# Patient Record
Sex: MALE | Race: OTHER | HISPANIC OR LATINO
[De-identification: names, ages, dates, MRNs, and addresses within clinical notes are randomized per-mention and may not be internally consistent; named-entity substitution may affect disease eponyms.]

---

## 2018-10-17 ENCOUNTER — APPOINTMENT (OUTPATIENT)
Dept: ORTHOPEDIC SURGERY | Facility: CLINIC | Age: 68
End: 2018-10-17
Payer: COMMERCIAL

## 2018-10-17 PROCEDURE — 99203 OFFICE O/P NEW LOW 30 MIN: CPT

## 2018-10-25 ENCOUNTER — OUTPATIENT (OUTPATIENT)
Dept: OUTPATIENT SERVICES | Facility: HOSPITAL | Age: 68
LOS: 1 days | Discharge: HOME | End: 2018-10-25
Payer: MEDICARE

## 2018-10-25 VITALS
HEART RATE: 82 BPM | SYSTOLIC BLOOD PRESSURE: 132 MMHG | TEMPERATURE: 98 F | HEIGHT: 64 IN | DIASTOLIC BLOOD PRESSURE: 85 MMHG | WEIGHT: 178.13 LBS | RESPIRATION RATE: 18 BRPM | OXYGEN SATURATION: 96 %

## 2018-10-25 DIAGNOSIS — Z96.7 PRESENCE OF OTHER BONE AND TENDON IMPLANTS: Chronic | ICD-10-CM

## 2018-10-25 DIAGNOSIS — C61 MALIGNANT NEOPLASM OF PROSTATE: ICD-10-CM

## 2018-10-25 DIAGNOSIS — Z94.7 CORNEAL TRANSPLANT STATUS: Chronic | ICD-10-CM

## 2018-10-25 DIAGNOSIS — Z01.818 ENCOUNTER FOR OTHER PREPROCEDURAL EXAMINATION: ICD-10-CM

## 2018-10-25 DIAGNOSIS — S83.241A OTHER TEAR OF MEDIAL MENISCUS, CURRENT INJURY, RIGHT KNEE, INITIAL ENCOUNTER: ICD-10-CM

## 2018-10-25 DIAGNOSIS — E78.5 HYPERLIPIDEMIA, UNSPECIFIED: ICD-10-CM

## 2018-10-25 LAB
ANION GAP SERPL CALC-SCNC: 8 MMOL/L — SIGNIFICANT CHANGE UP (ref 5–17)
BASOPHILS # BLD AUTO: 0.05 K/UL — SIGNIFICANT CHANGE UP (ref 0–0.2)
BASOPHILS NFR BLD AUTO: 1.1 % — SIGNIFICANT CHANGE UP (ref 0–2)
BUN SERPL-MCNC: 21 MG/DL — SIGNIFICANT CHANGE UP (ref 7–23)
CALCIUM SERPL-MCNC: 8.8 MG/DL — SIGNIFICANT CHANGE UP (ref 8.5–10.1)
CHLORIDE SERPL-SCNC: 105 MMOL/L — SIGNIFICANT CHANGE UP (ref 96–108)
CO2 SERPL-SCNC: 30 MMOL/L — SIGNIFICANT CHANGE UP (ref 22–31)
CREAT SERPL-MCNC: 0.91 MG/DL — SIGNIFICANT CHANGE UP (ref 0.5–1.3)
EOSINOPHIL # BLD AUTO: 0.27 K/UL — SIGNIFICANT CHANGE UP (ref 0–0.5)
EOSINOPHIL NFR BLD AUTO: 6.2 % — HIGH (ref 0–6)
GLUCOSE SERPL-MCNC: 84 MG/DL — SIGNIFICANT CHANGE UP (ref 70–99)
HCT VFR BLD CALC: 43.7 % — SIGNIFICANT CHANGE UP (ref 39–50)
HGB BLD-MCNC: 14.6 G/DL — SIGNIFICANT CHANGE UP (ref 13–17)
IMM GRANULOCYTES NFR BLD AUTO: 0.2 % — SIGNIFICANT CHANGE UP (ref 0–1.5)
LYMPHOCYTES # BLD AUTO: 1.61 K/UL — SIGNIFICANT CHANGE UP (ref 1–3.3)
LYMPHOCYTES # BLD AUTO: 36.8 % — SIGNIFICANT CHANGE UP (ref 13–44)
MCHC RBC-ENTMCNC: 30.5 PG — SIGNIFICANT CHANGE UP (ref 27–34)
MCHC RBC-ENTMCNC: 33.4 GM/DL — SIGNIFICANT CHANGE UP (ref 32–36)
MCV RBC AUTO: 91.2 FL — SIGNIFICANT CHANGE UP (ref 80–100)
MONOCYTES # BLD AUTO: 0.63 K/UL — SIGNIFICANT CHANGE UP (ref 0–0.9)
MONOCYTES NFR BLD AUTO: 14.4 % — HIGH (ref 2–14)
NEUTROPHILS # BLD AUTO: 1.8 K/UL — SIGNIFICANT CHANGE UP (ref 1.8–7.4)
NEUTROPHILS NFR BLD AUTO: 41.3 % — LOW (ref 43–77)
NRBC # BLD: 0 /100 WBCS — SIGNIFICANT CHANGE UP (ref 0–0)
PLATELET # BLD AUTO: 187 K/UL — SIGNIFICANT CHANGE UP (ref 150–400)
POTASSIUM SERPL-MCNC: 4.2 MMOL/L — SIGNIFICANT CHANGE UP (ref 3.5–5.3)
POTASSIUM SERPL-SCNC: 4.2 MMOL/L — SIGNIFICANT CHANGE UP (ref 3.5–5.3)
RBC # BLD: 4.79 M/UL — SIGNIFICANT CHANGE UP (ref 4.2–5.8)
RBC # FLD: 14.2 % — SIGNIFICANT CHANGE UP (ref 10.3–14.5)
SODIUM SERPL-SCNC: 143 MMOL/L — SIGNIFICANT CHANGE UP (ref 135–145)
WBC # BLD: 4.37 K/UL — SIGNIFICANT CHANGE UP (ref 3.8–10.5)
WBC # FLD AUTO: 4.37 K/UL — SIGNIFICANT CHANGE UP (ref 3.8–10.5)

## 2018-10-25 PROCEDURE — 93010 ELECTROCARDIOGRAM REPORT: CPT | Mod: NC

## 2018-10-25 NOTE — H&P PST ADULT - HISTORY OF PRESENT ILLNESS
68M pmh prostate CA s/p RT 2016, hl, c/o Right knee pain here for PST for scheduled Right knee arthroscopy

## 2018-10-25 NOTE — H&P PST ADULT - PSH
History of cornea transplant  2002  Status post open reduction with internal fixation of fracture  left arm 2013

## 2018-10-25 NOTE — H&P PST ADULT - ASSESSMENT
68M pmh prostate CA s/p RT 2016, hl, c/o Right knee pain here for PST for scheduled Right knee arthroscopy  CAPRINI SCORE    AGE RELATED RISK FACTORS                                                       MOBILITY RELATED FACTORS  [ ] Age 41-60 years                                            (1 Point)                  [ ] Bed rest                                                        (1 Point)  [x ] Age: 61-74 years                                           (2 Points)                [ ] Plaster cast                                                   (2 Points)  [ ] Age= 75 years                                              (3 Points)                 [ ] Bed bound for more than 72 hours                   (2 Points)    DISEASE RELATED RISK FACTORS                                               GENDER SPECIFIC FACTORS  [x ] Edema in the lower extremities                       (1 Point)                  [ ] Pregnancy                                                     (1 Point)  [x ] Varicose veins                                               (1 Point)                  [ ] Post-partum < 6 weeks                                   (1 Point)             [ x] BMI > 25 Kg/m2                                            (1 Point)                  [ ] Hormonal therapy  or oral contraception            (1 Point)                 [ ] Sepsis (in the previous month)                        (1 Point)                  [ ] History of pregnancy complications  [ ] Pneumonia or serious lung disease                                               [ ] Unexplained or recurrent                       (1 Point)           (in the previous month)                               (1 Point)  [ ] Abnormal pulmonary function test                     (1 Point)                 SURGERY RELATED RISK FACTORS  [ ] Acute myocardial infarction                              (1 Point)                 [ ]  Section                                            (1 Point)  [ ] Congestive heart failure (in the previous month)  (1 Point)                 [ ] Minor surgery                                                 (1 Point)   [ ] Inflammatory bowel disease                             (1 Point)                 [x ] Arthroscopic surgery                                        (2 Points)  [ ] Central venous access                                    (2 Points)                [ ] General surgery lasting more than 45 minutes   (2 Points)       [ ] Stroke (in the previous month)                          (5 Points)               [ ] Elective arthroplasty                                        (5 Points)                                                                                                                                               HEMATOLOGY RELATED FACTORS                                                 TRAUMA RELATED RISK FACTORS  [ ] Prior episodes of VTE                                     (3 Points)                 [ ] Fracture of the hip, pelvis, or leg                       (5 Points)  [ ] Positive family history for VTE                         (3 Points)                 [ ] Acute spinal cord injury (in the previous month)  (5 Points)  [ ] Prothrombin 88230 A                                      (3 Points)                 [ ] Paralysis  (less than 1 month)                          (5 Points)  [ ] Factor V Leiden                                             (3 Points)                 [ ] Multiple Trauma within 1 month                         (5 Points)  [ ] Lupus anticoagulants                                     (3 Points)                                                           [ ] Anticardiolipin antibodies                                (3 Points)                                                       [ ] High homocysteine in the blood                      (3 Points)                                             [ ] Other congenital or acquired thrombophilia       (3 Points)                                                [ ] Heparin induced thrombocytopenia                  (3 Points)                                          Total Score [       7   ]

## 2018-10-26 RX ORDER — SODIUM CHLORIDE 9 MG/ML
3 INJECTION INTRAMUSCULAR; INTRAVENOUS; SUBCUTANEOUS EVERY 8 HOURS
Qty: 0 | Refills: 0 | Status: DISCONTINUED | OUTPATIENT
Start: 2018-11-02 | End: 2018-11-17

## 2018-11-01 ENCOUNTER — TRANSCRIPTION ENCOUNTER (OUTPATIENT)
Age: 68
End: 2018-11-01

## 2018-11-02 ENCOUNTER — OUTPATIENT (OUTPATIENT)
Dept: OUTPATIENT SERVICES | Facility: HOSPITAL | Age: 68
LOS: 1 days | Discharge: HOME | End: 2018-11-02
Payer: MEDICARE

## 2018-11-02 ENCOUNTER — APPOINTMENT (OUTPATIENT)
Dept: ORTHOPEDIC SURGERY | Facility: HOSPITAL | Age: 68
End: 2018-11-02

## 2018-11-02 ENCOUNTER — RESULT REVIEW (OUTPATIENT)
Age: 68
End: 2018-11-02

## 2018-11-02 VITALS
WEIGHT: 182.98 LBS | RESPIRATION RATE: 16 BRPM | HEART RATE: 75 BPM | OXYGEN SATURATION: 98 % | TEMPERATURE: 97 F | DIASTOLIC BLOOD PRESSURE: 86 MMHG | HEIGHT: 64 IN | SYSTOLIC BLOOD PRESSURE: 149 MMHG

## 2018-11-02 VITALS
OXYGEN SATURATION: 99 % | DIASTOLIC BLOOD PRESSURE: 76 MMHG | RESPIRATION RATE: 16 BRPM | HEART RATE: 63 BPM | SYSTOLIC BLOOD PRESSURE: 160 MMHG

## 2018-11-02 DIAGNOSIS — Z94.7 CORNEAL TRANSPLANT STATUS: Chronic | ICD-10-CM

## 2018-11-02 DIAGNOSIS — Z96.7 PRESENCE OF OTHER BONE AND TENDON IMPLANTS: Chronic | ICD-10-CM

## 2018-11-02 PROCEDURE — 88304 TISSUE EXAM BY PATHOLOGIST: CPT | Mod: 26

## 2018-11-02 PROCEDURE — 29881 ARTHRS KNE SRG MNISECTMY M/L: CPT | Mod: RT

## 2018-11-02 RX ORDER — DOCUSATE SODIUM 100 MG
1 CAPSULE ORAL
Qty: 21 | Refills: 0
Start: 2018-11-02 | End: 2018-11-08

## 2018-11-02 RX ORDER — SODIUM CHLORIDE 9 MG/ML
1000 INJECTION, SOLUTION INTRAVENOUS
Qty: 0 | Refills: 0 | Status: DISCONTINUED | OUTPATIENT
Start: 2018-11-02 | End: 2018-11-02

## 2018-11-02 RX ORDER — FENTANYL CITRATE 50 UG/ML
50 INJECTION INTRAVENOUS
Qty: 0 | Refills: 0 | Status: DISCONTINUED | OUTPATIENT
Start: 2018-11-02 | End: 2018-11-02

## 2018-11-02 RX ORDER — ASPIRIN/CALCIUM CARB/MAGNESIUM 324 MG
1 TABLET ORAL
Qty: 30 | Refills: 0
Start: 2018-11-02 | End: 2018-12-01

## 2018-11-02 RX ORDER — ACETAMINOPHEN 500 MG
1000 TABLET ORAL ONCE
Qty: 0 | Refills: 0 | Status: COMPLETED | OUTPATIENT
Start: 2018-11-02 | End: 2018-11-02

## 2018-11-02 RX ORDER — FENTANYL CITRATE 50 UG/ML
25 INJECTION INTRAVENOUS
Qty: 0 | Refills: 0 | Status: DISCONTINUED | OUTPATIENT
Start: 2018-11-02 | End: 2018-11-02

## 2018-11-02 RX ORDER — ASPIRIN/CALCIUM CARB/MAGNESIUM 324 MG
325 TABLET ORAL
Qty: 0 | Refills: 0 | COMMUNITY

## 2018-11-02 RX ORDER — ONDANSETRON 8 MG/1
1 TABLET, FILM COATED ORAL
Qty: 28 | Refills: 0 | OUTPATIENT
Start: 2018-11-02 | End: 2018-11-08

## 2018-11-02 RX ADMIN — SODIUM CHLORIDE 75 MILLILITER(S): 9 INJECTION, SOLUTION INTRAVENOUS at 13:22

## 2018-11-02 RX ADMIN — Medication 1000 MILLIGRAM(S): at 13:40

## 2018-11-02 RX ADMIN — FENTANYL CITRATE 25 MICROGRAM(S): 50 INJECTION INTRAVENOUS at 13:40

## 2018-11-02 RX ADMIN — Medication 400 MILLIGRAM(S): at 13:22

## 2018-11-02 RX ADMIN — FENTANYL CITRATE 25 MICROGRAM(S): 50 INJECTION INTRAVENOUS at 13:18

## 2018-11-02 NOTE — ASU DISCHARGE PLAN (ADULT/PEDIATRIC). - MEDICATION SUMMARY - MEDICATIONS TO TAKE
I will START or STAY ON the medications listed below when I get home from the hospital:    Aspirin Enteric Coated 325 mg oral delayed release tablet  -- 1 tab(s) by mouth once a day . For Blood Clot Prevention MDD:1  -- Swallow whole.  Do not crush.  Take with food or milk.    -- Indication: For dvt ppx    Norco 5 mg-325 mg oral tablet  -- 1 tab(s) by mouth every 4 to 6 hours, As Needed -for severe pain MDD:4   -- Caution federal law prohibits the transfer of this drug to any person other  than the person for whom it was prescribed.  May cause drowsiness.  Alcohol may intensify this effect.  Use care when operating dangerous machinery.  This product contains acetaminophen.  Do not use  with any other product containing acetaminophen to prevent possible liver damage.  Using more of this medication than prescribed may cause serious breathing problems.    -- Indication: For severe pain    Flomax  -- 1 cap(s) by mouth once a day  -- Indication: For per pmd    ondansetron 4 mg oral tablet  -- 1 tab(s) by mouth every 6 hours   -- Indication: For antinausea    fenofibrate 145 mg oral tablet  -- 1 tab(s) by mouth once a day  -- Indication: For per pmd    docusate sodium 100 mg oral tablet  -- 1 tab(s) by mouth 3 times a day, As Needed -for constipation MDD:3  -- Medication should be taken with plenty of water.    -- Indication: For laxative

## 2018-11-02 NOTE — BRIEF OPERATIVE NOTE - PROCEDURE
<<-----Click on this checkbox to enter Procedure Partial meniscectomy of knee  11/02/2018    Active  KATHLEEN

## 2018-11-05 LAB — SURGICAL PATHOLOGY STUDY: SIGNIFICANT CHANGE UP

## 2018-11-06 DIAGNOSIS — Z79.82 LONG TERM (CURRENT) USE OF ASPIRIN: ICD-10-CM

## 2018-11-06 DIAGNOSIS — M25.561 PAIN IN RIGHT KNEE: ICD-10-CM

## 2018-11-06 DIAGNOSIS — Z85.46 PERSONAL HISTORY OF MALIGNANT NEOPLASM OF PROSTATE: ICD-10-CM

## 2018-11-06 DIAGNOSIS — M17.11 UNILATERAL PRIMARY OSTEOARTHRITIS, RIGHT KNEE: ICD-10-CM

## 2018-11-06 DIAGNOSIS — E66.9 OBESITY, UNSPECIFIED: ICD-10-CM

## 2018-11-06 DIAGNOSIS — E78.5 HYPERLIPIDEMIA, UNSPECIFIED: ICD-10-CM

## 2018-11-06 DIAGNOSIS — M23.231 DERANGEMENT OF OTHER MEDIAL MENISCUS DUE TO OLD TEAR OR INJURY, RIGHT KNEE: ICD-10-CM

## 2018-11-16 ENCOUNTER — RECORD ABSTRACTING (OUTPATIENT)
Age: 68
End: 2018-11-16

## 2018-11-16 ENCOUNTER — APPOINTMENT (OUTPATIENT)
Dept: ORTHOPEDIC SURGERY | Facility: CLINIC | Age: 68
End: 2018-11-16
Payer: MEDICARE

## 2018-11-16 DIAGNOSIS — Z80.9 FAMILY HISTORY OF MALIGNANT NEOPLASM, UNSPECIFIED: ICD-10-CM

## 2018-11-16 DIAGNOSIS — Z85.46 PERSONAL HISTORY OF MALIGNANT NEOPLASM OF PROSTATE: ICD-10-CM

## 2018-11-16 PROBLEM — C61 MALIGNANT NEOPLASM OF PROSTATE: Chronic | Status: ACTIVE | Noted: 2018-10-25

## 2018-11-16 PROBLEM — E78.5 HYPERLIPIDEMIA, UNSPECIFIED: Chronic | Status: ACTIVE | Noted: 2018-10-25

## 2018-11-16 PROCEDURE — 99024 POSTOP FOLLOW-UP VISIT: CPT

## 2018-12-18 ENCOUNTER — APPOINTMENT (OUTPATIENT)
Dept: ORTHOPEDIC SURGERY | Facility: CLINIC | Age: 68
End: 2018-12-18

## 2019-01-07 ENCOUNTER — APPOINTMENT (OUTPATIENT)
Dept: ORTHOPEDIC SURGERY | Facility: CLINIC | Age: 69
End: 2019-01-07
Payer: MEDICARE

## 2019-01-07 DIAGNOSIS — S83.241A OTHER TEAR OF MEDIAL MENISCUS, CURRENT INJURY, RIGHT KNEE, INITIAL ENCOUNTER: ICD-10-CM

## 2019-01-07 PROCEDURE — 99024 POSTOP FOLLOW-UP VISIT: CPT

## 2019-01-07 NOTE — HISTORY OF PRESENT ILLNESS
[de-identified] : R knee pain [de-identified] : 69 yo M s/p Right knee arthroscopy, partial medial meniscectomy, 11/2/18.  He continues to c/o pain medial aspect knee worse with ambulation, for about 1 month.  He is no longer going to PT, last time was early December.  Denies numbness/tingling, re-injury. [de-identified] : Right knee exam\par Incisions are healing well no erythema\par Mild effusion\par Range of motion 0-°100\par +Medial joint line tenderness\par calf is soft and nontender\par Able to flex and extend all toes\par Sensation intact throughout\par brisk capillary refill. [de-identified] : 69 yo M s/p Right knee arthroscopy, partial medial meniscectomy, 11/2/18.   [de-identified] : Educated him upon recovery process from surgery.  No new injuries or change in activity.  Educated him the importance of physical therapy.  He also has mild/moderate OA as per MRI, which may be contributing to his symptom flare-ups.  Provided him with new PT script today, as well as meloxicam.  He may f/u at his next scheduled appointment 1/23/19.  All questions answered he expresses understanding.

## 2019-01-23 ENCOUNTER — APPOINTMENT (OUTPATIENT)
Dept: ORTHOPEDIC SURGERY | Facility: CLINIC | Age: 69
End: 2019-01-23

## 2019-07-31 ENCOUNTER — APPOINTMENT (OUTPATIENT)
Dept: ORTHOPEDIC SURGERY | Facility: CLINIC | Age: 69
End: 2019-07-31

## 2019-11-06 ENCOUNTER — APPOINTMENT (OUTPATIENT)
Dept: ORTHOPEDIC SURGERY | Facility: CLINIC | Age: 69
End: 2019-11-06
Payer: MEDICARE

## 2019-11-06 ENCOUNTER — OTHER (OUTPATIENT)
Age: 69
End: 2019-11-06

## 2019-11-06 DIAGNOSIS — M17.11 UNILATERAL PRIMARY OSTEOARTHRITIS, RIGHT KNEE: ICD-10-CM

## 2019-11-06 PROCEDURE — 73562 X-RAY EXAM OF KNEE 3: CPT | Mod: 50

## 2019-11-06 PROCEDURE — 99204 OFFICE O/P NEW MOD 45 MIN: CPT

## 2019-11-06 RX ORDER — TAMSULOSIN HYDROCHLORIDE 0.4 MG/1
CAPSULE ORAL
Refills: 0 | Status: ACTIVE | COMMUNITY

## 2019-11-06 NOTE — HISTORY OF PRESENT ILLNESS
[Pain Location] : pain [4] : an average pain level of 4/10 [2] : a minimum pain level of 2/10 [Standing] : standing [Daily] : ~He/She~ states the symptoms seem to be occuring daily [Walking] : worsened by walking [Rest] : relieved by rest [Worsening] : worsening [___ mths] : [unfilled] month(s) ago [8] : a current pain level of 8/10 [de-identified] : 69 year old male here for evaluation of bilateral knee pain, left worse than right. Patient has been having pain past 6-8 months. He denies any injury associated with pain. He rates pain intensity is 8/10 the pain is constant he is using a cane. Pain is worse with navigating stairs, ambulating, lifting his legs. He tried cortisone injection twice in the past the last one was about 2 months ago in the left knee only. He did try physical therapy he is taking medication for pain. Patient had right knee scope November of 18. The patient brings left knee MRI that was done in August, 2019 shows trabecular stress fracture or subchondral bone edema severe grade 2 tear of MCL and degenerative tear in medial meniscus. Pt was prescribed by other provider but has not been using. He does inquire about TKA in the future.

## 2019-11-06 NOTE — PHYSICAL EXAM
[LE] : Sensory: Intact in bilateral lower extremities [ALL] : dorsalis pedis, posterior tibial, femoral, popliteal, and radial 2+ and symmetric bilaterally [Cane] : ambulates with cane [Antalgic] : antalgic [de-identified] : GENERAL APPEARANCE: Well nourished and hydrated, pleasant, alert, and oriented x 3. Appears their stated age. \par HEENT: Normocephalic, extraocular eye motion intact. Nasal septum midline. Oral cavity clear. External auditory canal clear. \par RESPIRATORY: Breath sounds clear and audible in all lobes. No wheezing, No accessory muscle use.\par CARDIOVASCULAR: No apparent abnormalities. No lower leg edema. No varicosities. Pedal pulses are palpable.\par NEUROLOGIC: Sensation is normal, no muscle weakness in the upper or lower extremities.\par DERMATOLOGIC: No apparent skin lesions, moist, warm, no rash.\par SPINE: Cervical spine appears normal and moves freely; thoracic spine appears normal and moves freely; lumbosacral spine appears normal and moves freely, normal, nontender.\par MUSCULOSKELETAL: Hands, wrists, and elbows are normal and move freely, shoulders are normal and move freely.  [de-identified] : Bilateral knee exam shows medial joint line tenderness is in slight varus alignment and patient has moderate joint effusion left knee. range of motion is apprehensive in the left knee he has 3 - 120°. \par right knee range of motion 0-120° without pain [de-identified] : MRI of the left knee obtained 8/17/2019 at [facility] shows trabecular stress fx and subchondral bone at the extreme medial aspect of the medial tibial plateau. \par Severe grade II tear of the MCL and underlying medial meniscofemoral ligament.\par Low-grade chronic ACL sprain.\par Trace joint effusion.\par Degenerative tear of the posterior horn medial meniscus. \par Chondromalacia as detailed. \par \par 5V Xray of the bilateral knees done in office today and reviewed by Dr. Jose Abreu demonstrates severe medial compartment osteoarthritis of the bilateral knees.

## 2019-11-06 NOTE — ADDENDUM
[FreeTextEntry1] : I, Eric Sheehan, acted solely as a scribe for Dr. Jose Abreu on this date 11/06/2019.

## 2020-04-30 ENCOUNTER — APPOINTMENT (OUTPATIENT)
Dept: ORTHOPEDIC SURGERY | Facility: HOSPITAL | Age: 70
End: 2020-04-30

## 2020-05-22 ENCOUNTER — APPOINTMENT (OUTPATIENT)
Dept: ORTHOPEDIC SURGERY | Facility: CLINIC | Age: 70
End: 2020-05-22

## 2020-06-23 ENCOUNTER — APPOINTMENT (OUTPATIENT)
Dept: ORTHOPEDIC SURGERY | Facility: CLINIC | Age: 70
End: 2020-06-23

## 2020-07-24 ENCOUNTER — APPOINTMENT (OUTPATIENT)
Dept: ORTHOPEDIC SURGERY | Facility: CLINIC | Age: 70
End: 2020-07-24

## 2020-08-11 ENCOUNTER — OUTPATIENT (OUTPATIENT)
Dept: OUTPATIENT SERVICES | Facility: HOSPITAL | Age: 70
LOS: 1 days | End: 2020-08-11
Payer: COMMERCIAL

## 2020-08-11 VITALS
WEIGHT: 185.19 LBS | RESPIRATION RATE: 16 BRPM | HEART RATE: 19 BPM | HEIGHT: 64 IN | TEMPERATURE: 98 F | SYSTOLIC BLOOD PRESSURE: 154 MMHG | DIASTOLIC BLOOD PRESSURE: 80 MMHG | OXYGEN SATURATION: 99 %

## 2020-08-11 DIAGNOSIS — M17.12 UNILATERAL PRIMARY OSTEOARTHRITIS, LEFT KNEE: ICD-10-CM

## 2020-08-11 DIAGNOSIS — Z96.7 PRESENCE OF OTHER BONE AND TENDON IMPLANTS: Chronic | ICD-10-CM

## 2020-08-11 DIAGNOSIS — Z01.818 ENCOUNTER FOR OTHER PREPROCEDURAL EXAMINATION: ICD-10-CM

## 2020-08-11 DIAGNOSIS — E78.5 HYPERLIPIDEMIA, UNSPECIFIED: ICD-10-CM

## 2020-08-11 DIAGNOSIS — Z29.9 ENCOUNTER FOR PROPHYLACTIC MEASURES, UNSPECIFIED: ICD-10-CM

## 2020-08-11 DIAGNOSIS — Z94.7 CORNEAL TRANSPLANT STATUS: Chronic | ICD-10-CM

## 2020-08-11 LAB
A1C WITH ESTIMATED AVERAGE GLUCOSE RESULT: 5.9 % — HIGH (ref 4–5.6)
ANION GAP SERPL CALC-SCNC: 13 MMOL/L — SIGNIFICANT CHANGE UP (ref 5–17)
APTT BLD: 29.7 SEC — SIGNIFICANT CHANGE UP (ref 27.5–35.5)
BASOPHILS # BLD AUTO: 0.06 K/UL — SIGNIFICANT CHANGE UP (ref 0–0.2)
BASOPHILS NFR BLD AUTO: 1.3 % — SIGNIFICANT CHANGE UP (ref 0–2)
BLD GP AB SCN SERPL QL: SIGNIFICANT CHANGE UP
BUN SERPL-MCNC: 21 MG/DL — HIGH (ref 8–20)
CALCIUM SERPL-MCNC: 9.5 MG/DL — SIGNIFICANT CHANGE UP (ref 8.6–10.2)
CHLORIDE SERPL-SCNC: 101 MMOL/L — SIGNIFICANT CHANGE UP (ref 98–107)
CO2 SERPL-SCNC: 26 MMOL/L — SIGNIFICANT CHANGE UP (ref 22–29)
CREAT SERPL-MCNC: 0.86 MG/DL — SIGNIFICANT CHANGE UP (ref 0.5–1.3)
EOSINOPHIL # BLD AUTO: 0.32 K/UL — SIGNIFICANT CHANGE UP (ref 0–0.5)
EOSINOPHIL NFR BLD AUTO: 6.9 % — HIGH (ref 0–6)
ESTIMATED AVERAGE GLUCOSE: 123 MG/DL — HIGH (ref 68–114)
GLUCOSE SERPL-MCNC: 99 MG/DL — SIGNIFICANT CHANGE UP (ref 70–99)
HCT VFR BLD CALC: 41.9 % — SIGNIFICANT CHANGE UP (ref 39–50)
HGB BLD-MCNC: 13.7 G/DL — SIGNIFICANT CHANGE UP (ref 13–17)
IMM GRANULOCYTES NFR BLD AUTO: 0.6 % — SIGNIFICANT CHANGE UP (ref 0–1.5)
INR BLD: 1.02 RATIO — SIGNIFICANT CHANGE UP (ref 0.88–1.16)
LYMPHOCYTES # BLD AUTO: 1.65 K/UL — SIGNIFICANT CHANGE UP (ref 1–3.3)
LYMPHOCYTES # BLD AUTO: 35.6 % — SIGNIFICANT CHANGE UP (ref 13–44)
MCHC RBC-ENTMCNC: 29.9 PG — SIGNIFICANT CHANGE UP (ref 27–34)
MCHC RBC-ENTMCNC: 32.7 GM/DL — SIGNIFICANT CHANGE UP (ref 32–36)
MCV RBC AUTO: 91.5 FL — SIGNIFICANT CHANGE UP (ref 80–100)
MONOCYTES # BLD AUTO: 0.66 K/UL — SIGNIFICANT CHANGE UP (ref 0–0.9)
MONOCYTES NFR BLD AUTO: 14.2 % — HIGH (ref 2–14)
MRSA PCR RESULT.: SIGNIFICANT CHANGE UP
NEUTROPHILS # BLD AUTO: 1.92 K/UL — SIGNIFICANT CHANGE UP (ref 1.8–7.4)
NEUTROPHILS NFR BLD AUTO: 41.4 % — LOW (ref 43–77)
PLATELET # BLD AUTO: 201 K/UL — SIGNIFICANT CHANGE UP (ref 150–400)
POTASSIUM SERPL-MCNC: 4 MMOL/L — SIGNIFICANT CHANGE UP (ref 3.5–5.3)
POTASSIUM SERPL-SCNC: 4 MMOL/L — SIGNIFICANT CHANGE UP (ref 3.5–5.3)
PROTHROM AB SERPL-ACNC: 11.8 SEC — SIGNIFICANT CHANGE UP (ref 10.6–13.6)
RBC # BLD: 4.58 M/UL — SIGNIFICANT CHANGE UP (ref 4.2–5.8)
RBC # FLD: 14 % — SIGNIFICANT CHANGE UP (ref 10.3–14.5)
S AUREUS DNA NOSE QL NAA+PROBE: SIGNIFICANT CHANGE UP
SODIUM SERPL-SCNC: 140 MMOL/L — SIGNIFICANT CHANGE UP (ref 135–145)
WBC # BLD: 4.64 K/UL — SIGNIFICANT CHANGE UP (ref 3.8–10.5)
WBC # FLD AUTO: 4.64 K/UL — SIGNIFICANT CHANGE UP (ref 3.8–10.5)

## 2020-08-11 PROCEDURE — G0463: CPT

## 2020-08-11 PROCEDURE — 93010 ELECTROCARDIOGRAM REPORT: CPT

## 2020-08-11 PROCEDURE — 71046 X-RAY EXAM CHEST 2 VIEWS: CPT | Mod: 26

## 2020-08-11 PROCEDURE — 93005 ELECTROCARDIOGRAM TRACING: CPT

## 2020-08-11 PROCEDURE — 71046 X-RAY EXAM CHEST 2 VIEWS: CPT

## 2020-08-11 RX ORDER — SODIUM CHLORIDE 9 MG/ML
3 INJECTION INTRAMUSCULAR; INTRAVENOUS; SUBCUTANEOUS EVERY 8 HOURS
Refills: 0 | Status: DISCONTINUED | OUTPATIENT
Start: 2020-09-01 | End: 2020-09-03

## 2020-08-11 RX ORDER — FENOFIBRATE,MICRONIZED 130 MG
1 CAPSULE ORAL
Qty: 0 | Refills: 0 | DISCHARGE

## 2020-08-11 NOTE — PATIENT PROFILE ADULT - NSPROEDALEARNPREF_GEN_A_NUR
individual instruction/Macedonian  NP, instructed pt on pre-op instructions/teaching, tips for safer surgery, pain management scale, pre-surgical infection prevention instructions, MRSA/MSSA instructions, Covid swab appt info sheet, medical clearance pending. Pt verbalized understanding of all instructions given verbally in Macedonian and in writing in Macedonian./written material/verbal instruction verbal instruction/individual instruction/Afghan  NP, [place], instructed pt on pre-op instructions/teaching, tips for safer surgery, pain management scale, pre-surgical infection prevention instructions, MRSA/MSSA instructions, Covid swab appt info sheet, medical clearance pending. Pt verbalized understanding of all instructions given verbally in Afghan and in writing in Afghan./written material

## 2020-08-11 NOTE — H&P PST ADULT - ASSESSMENT
OPIOID RISK TOOL    ELÍAS EACH BOX THAT APPLIES AND ADD TOTALS AT THE END    FAMILY HISTORY OF SUBSTANCE ABUSE                 FEMALE         MALE                                                Alcohol                             [  ]1 pt          [  ]3pts                                               Illegal Drugs                     [  ]2 pts        [  ]3pts                                               Rx Drugs                           [  ]4 pts        [  ]4 pts    PERSONAL HISTORY OF SUBSTANCE ABUSE                                                                                          Alcohol                             [  ]3 pts       [  ]3 pts                                               Illegal Drugs                     [  ]4 pts        [  ]4 pts                                               Rx Drugs                           [  ]5 pts        [  ]5 pts    AGE BETWEEN 16-45 YEARS                                      [  ]1 pt         [  ]1 pt    HISTORY OF PREADOLESCENT   SEXUAL ABUSE                                                             [  ]3 pts        [  ]0pts    PSYCHOLOGICAL DISEASE                     ADD, OCD, Bipolar, Schizophrenia        [  ]2 pts         [  ]2 pts                      Depression                                               [  ]1 pt           [  ]1 pt           SCORING TOTAL   (add numbers and type here)              (*0**)                                     A score of 3 or lower indicated LOW risk for future opioid abuse  A score of 4 to 7 indicated moderate risk for future opioid abuse  A score of 8 or higher indicates a high risk for opioid abuse  CAPRINI SCORE [CLOT]    AGE RELATED RISK FACTORS                                                       MOBILITY RELATED FACTORS  [ ] Age 41-60 years                                            (1 Point)                  [ ] Bed rest                                                        (1 Point)  [ ] Age: 61-74 years                                           (2 Points)                 [ ] Plaster cast                                                   (2 Points)  [ ] Age= 75 years                                              (3 Points)                 [ ] Bed bound for more than 72 hours                 (2 Points)    DISEASE RELATED RISK FACTORS                                               GENDER SPECIFIC FACTORS  [ ] Edema in the lower extremities                       (1 Point)                  [ ] Pregnancy                                                     (1 Point)  [ ] Varicose veins                                               (1 Point)                  [ ] Post-partum < 6 weeks                                   (1 Point)             [ ] BMI > 25 Kg/m2                                            (1 Point)                  [ ] Hormonal therapy  or oral contraception          (1 Point)                 [ ] Sepsis (in the previous month)                        (1 Point)                  [ ] History of pregnancy complications                 (1 point)  [ ] Pneumonia or serious lung disease                                               [ ] Unexplained or recurrent                     (1 Point)           (in the previous month)                               (1 Point)  [ ] Abnormal pulmonary function test                     (1 Point)                 SURGERY RELATED RISK FACTORS  [ ] Acute myocardial infarction                              (1 Point)                 [ ]  Section                                             (1 Point)  [ ] Congestive heart failure (in the previous month)  (1 Point)               [ ] Minor surgery                                                  (1 Point)   [ ] Inflammatory bowel disease                             (1 Point)                 [ ] Arthroscopic surgery                                        (2 Points)  [ ] Central venous access                                      (2 Points)                [ ] General surgery lasting more than 45 minutes   (2 Points)       [ ] Stroke (in the previous month)                          (5 Points)               [ ] Elective arthroplasty                                         (5 Points)                                                                                                                                               HEMATOLOGY RELATED FACTORS                                                 TRAUMA RELATED RISK FACTORS  [ ] Prior episodes of VTE                                     (3 Points)                [ ] Fracture of the hip, pelvis, or leg                       (5 Points)  [ ] Positive family history for VTE                         (3 Points)                 [ ] Acute spinal cord injury (in the previous month)  (5 Points)  [ ] Prothrombin 67351 A                                     (3 Points)                 [ ] Paralysis  (less than 1 month)                             (5 Points)  [ ] Factor V Leiden                                             (3 Points)                  [ ] Multiple Trauma within 1 month                        (5 Points)  [ ] Lupus anticoagulants                                     (3 Points)                                                           [ ] Anticardiolipin antibodies                               (3 Points)                                                       [ ] High homocysteine in the blood                      (3 Points)                                             [ ] Other congenital or acquired thrombophilia      (3 Points)                                                [ ] Heparin induced thrombocytopenia                  (3 Points)                                          Total Score [          ]    Caprini Score 0 - 2:  Low Risk, No VTE Prophylaxis required for most patients, encourage ambulation  Caprini Score 3 - 6:  At Risk, pharmacologic VTE prophylaxis is indicated for most patients (in the absence of a contraindication)  Caprini Score Greater than or = 7:  High Risk, pharmacologic VTE prophylaxis is indicated for most patients (in the absence of a contraindication) OPIOID RISK TOOL    ELÍAS EACH BOX THAT APPLIES AND ADD TOTALS AT THE END    FAMILY HISTORY OF SUBSTANCE ABUSE                 FEMALE         MALE                                                Alcohol                             [  ]1 pt          [  ]3pts                                               Illegal Drugs                     [  ]2 pts        [  ]3pts                                               Rx Drugs                           [  ]4 pts        [  ]4 pts    PERSONAL HISTORY OF SUBSTANCE ABUSE                                                                                          Alcohol                             [  ]3 pts       [  ]3 pts                                               Illegal Drugs                     [  ]4 pts        [  ]4 pts                                               Rx Drugs                           [  ]5 pts        [  ]5 pts    AGE BETWEEN 16-45 YEARS                                      [  ]1 pt         [  ]1 pt    HISTORY OF PREADOLESCENT   SEXUAL ABUSE                                                             [  ]3 pts        [  ]0pts    PSYCHOLOGICAL DISEASE                     ADD, OCD, Bipolar, Schizophrenia        [  ]2 pts         [  ]2 pts                      Depression                                               [  ]1 pt           [  ]1 pt           SCORING TOTAL   (add numbers and type here)              (*0**)                                     A score of 3 or lower indicated LOW risk for future opioid abuse  A score of 4 to 7 indicated moderate risk for future opioid abuse  A score of 8 or higher indicates a high risk for opioid abuse  CAPRINI SCORE [CLOT]    AGE RELATED RISK FACTORS                                                       MOBILITY RELATED FACTORS  [ ] Age 41-60 years                                            (1 Point)                  [ ] Bed rest                                                        (1 Point)  [ x] Age: 61-74 years                                           (2 Points)                 [ ] Plaster cast                                                   (2 Points)  [ ] Age= 75 years                                              (3 Points)                 [ ] Bed bound for more than 72 hours                 (2 Points)    DISEASE RELATED RISK FACTORS                                               GENDER SPECIFIC FACTORS  [ ] Edema in the lower extremities                       (1 Point)                  [ ] Pregnancy                                                     (1 Point)  [ ] Varicose veins                                               (1 Point)                  [ ] Post-partum < 6 weeks                                   (1 Point)             [x] BMI > 25 Kg/m2                                            (1 Point)                  [ ] Hormonal therapy  or oral contraception          (1 Point)                 [ ] Sepsis (in the previous month)                        (1 Point)                  [ ] History of pregnancy complications                 (1 point)  [ ] Pneumonia or serious lung disease                                               [ ] Unexplained or recurrent                     (1 Point)           (in the previous month)                               (1 Point)  [ ] Abnormal pulmonary function test                     (1 Point)                 SURGERY RELATED RISK FACTORS  [ ] Acute myocardial infarction                              (1 Point)                 [ ]  Section                                             (1 Point)  [ ] Congestive heart failure (in the previous month)  (1 Point)               [ ] Minor surgery                                                  (1 Point)   [ ] Inflammatory bowel disease                             (1 Point)                 [ ] Arthroscopic surgery                                        (2 Points)  [ ] Central venous access                                      (2 Points)                [x ] General surgery lasting more than 45 minutes   (2 Points)       [ ] Stroke (in the previous month)                          (5 Points)               [ ] Elective arthroplasty                                         (5 Points)                                                                                                                                               HEMATOLOGY RELATED FACTORS                                                 TRAUMA RELATED RISK FACTORS  [ ] Prior episodes of VTE                                     (3 Points)                [ ] Fracture of the hip, pelvis, or leg                       (5 Points)  [ ] Positive family history for VTE                         (3 Points)                 [ ] Acute spinal cord injury (in the previous month)  (5 Points)  [ ] Prothrombin 68217 A                                     (3 Points)                 [ ] Paralysis  (less than 1 month)                             (5 Points)  [ ] Factor V Leiden                                             (3 Points)                  [ ] Multiple Trauma within 1 month                        (5 Points)  [ ] Lupus anticoagulants                                     (3 Points)                                                           [ ] Anticardiolipin antibodies                               (3 Points)                                                       [ ] High homocysteine in the blood                      (3 Points)                                             [ ] Other congenital or acquired thrombophilia      (3 Points)                                                [ ] Heparin induced thrombocytopenia                  (3 Points)                                          Total Score [     5     ]    Caprini Score 0 - 2:  Low Risk, No VTE Prophylaxis required for most patients, encourage ambulation  Caprini Score 3 - 6:  At Risk, pharmacologic VTE prophylaxis is indicated for most patients (in the absence of a contraindication)  Caprini Score Greater than or = 7:  High Risk, pharmacologic VTE prophylaxis is indicated for most patients (in the absence of a contraindication)      Patient is a 70 y/o Bangladeshi speaking male . Patient c/o left knee pain , he is having trouble doing daily activity's walking and lifting his legs . Patient pain level  ranges from 4/10 to 8/10 with relief from prescribed medication naproxen. Patient states he had a MRI as per patient his findings of " bone on bone . Patient presents to [facility] for evaluation for a left knee replacement with Dr Abreu on 20 OPIOID RISK TOOL    ELÍAS EACH BOX THAT APPLIES AND ADD TOTALS AT THE END    FAMILY HISTORY OF SUBSTANCE ABUSE                 FEMALE         MALE                                                Alcohol                             [  ]1 pt          [  ]3pts                                               Illegal Drugs                     [  ]2 pts        [  ]3pts                                               Rx Drugs                           [  ]4 pts        [  ]4 pts    PERSONAL HISTORY OF SUBSTANCE ABUSE                                                                                          Alcohol                             [  ]3 pts       [  ]3 pts                                               Illegal Drugs                     [  ]4 pts        [  ]4 pts                                               Rx Drugs                           [  ]5 pts        [  ]5 pts    AGE BETWEEN 16-45 YEARS                                      [  ]1 pt         [  ]1 pt    HISTORY OF PREADOLESCENT   SEXUAL ABUSE                                                             [  ]3 pts        [  ]0pts    PSYCHOLOGICAL DISEASE                     ADD, OCD, Bipolar, Schizophrenia        [  ]2 pts         [  ]2 pts                      Depression                                               [  ]1 pt           [  ]1 pt           SCORING TOTAL   (add numbers and type here)              (*0**)                                     A score of 3 or lower indicated LOW risk for future opioid abuse  A score of 4 to 7 indicated moderate risk for future opioid abuse  A score of 8 or higher indicates a high risk for opioid abuse  CAPRINI SCORE [CLOT]    AGE RELATED RISK FACTORS                                                       MOBILITY RELATED FACTORS  [ ] Age 41-60 years                                            (1 Point)                  [ ] Bed rest                                                        (1 Point)  [ x] Age: 61-74 years                                           (2 Points)                 [ ] Plaster cast                                                   (2 Points)  [ ] Age= 75 years                                              (3 Points)                 [ ] Bed bound for more than 72 hours                 (2 Points)    DISEASE RELATED RISK FACTORS                                               GENDER SPECIFIC FACTORS  [ ] Edema in the lower extremities                       (1 Point)                  [ ] Pregnancy                                                     (1 Point)  [ ] Varicose veins                                               (1 Point)                  [ ] Post-partum < 6 weeks                                   (1 Point)             [x] BMI > 25 Kg/m2                                            (1 Point)                  [ ] Hormonal therapy  or oral contraception          (1 Point)                 [ ] Sepsis (in the previous month)                        (1 Point)                  [ ] History of pregnancy complications                 (1 point)  [ ] Pneumonia or serious lung disease                                               [ ] Unexplained or recurrent                     (1 Point)           (in the previous month)                               (1 Point)  [ ] Abnormal pulmonary function test                     (1 Point)                 SURGERY RELATED RISK FACTORS  [ ] Acute myocardial infarction                              (1 Point)                 [ ]  Section                                             (1 Point)  [ ] Congestive heart failure (in the previous month)  (1 Point)               [ ] Minor surgery                                                  (1 Point)   [ ] Inflammatory bowel disease                             (1 Point)                 [ ] Arthroscopic surgery                                        (2 Points)  [ ] Central venous access                                      (2 Points)                [x ] General surgery lasting more than 45 minutes   (2 Points)       [ ] Stroke (in the previous month)                          (5 Points)               [ ] Elective arthroplasty                                         (5 Points)                                                                                                                                               HEMATOLOGY RELATED FACTORS                                                 TRAUMA RELATED RISK FACTORS  [ ] Prior episodes of VTE                                     (3 Points)                [ ] Fracture of the hip, pelvis, or leg                       (5 Points)  [ ] Positive family history for VTE                         (3 Points)                 [ ] Acute spinal cord injury (in the previous month)  (5 Points)  [ ] Prothrombin 19699 A                                     (3 Points)                 [ ] Paralysis  (less than 1 month)                             (5 Points)  [ ] Factor V Leiden                                             (3 Points)                  [ ] Multiple Trauma within 1 month                        (5 Points)  [ ] Lupus anticoagulants                                     (3 Points)                                                           [ ] Anticardiolipin antibodies                               (3 Points)                                                       [ ] High homocysteine in the blood                      (3 Points)                                             [ ] Other congenital or acquired thrombophilia      (3 Points)                                                [ ] Heparin induced thrombocytopenia                  (3 Points)                                          Total Score [     5     ]    Caprini Score 0 - 2:  Low Risk, No VTE Prophylaxis required for most patients, encourage ambulation  Caprini Score 3 - 6:  At Risk, pharmacologic VTE prophylaxis is indicated for most patients (in the absence of a contraindication)  Caprini Score Greater than or = 7:  High Risk, pharmacologic VTE prophylaxis is indicated for most patients (in the absence of a contraindication)    Patient is a 70 y/o Cypriot speaking male . Patient c/o left knee pain walks with a cane. Patient states  he is having trouble doing daily activity's walking and lifting his legs . Patient pain level ranges from 4/10 to 8/10 with relief from prescribed medication naproxen. Patient states he had a MRI as per patient his findings of " bone on bone . Patient presents to PST for evaluation for a left knee replacement with Dr Abreu on 20 P    Patient educated on pre op prep with written and verbal instruction using language services ,Patient is going for medical clearance with Dr Waite .

## 2020-08-11 NOTE — H&P PST ADULT - HISTORY OF PRESENT ILLNESS
History of Present Illness   69 year old male here for evaluation of bilateral knee pain, left worse than right. Patient has been having pain past 6-8 months. He denies any injury associated with pain. He rates pain intensity is 8/10 the pain is constant he is using a cane. Pain is worse with navigating stairs, ambulating, lifting his legs. He tried cortisone injection twice in the past the last one was about 2 months ago in the left knee only. He did try physical therapy he is taking medication for pain. Patient had right knee scope November of 18. The patient brings left knee MRI that was done in August, 2019 shows trabecular stress fracture or subchondral bone edema severe grade 2 tear of MCL and degenerative tear in medial meniscus. Pt was prescribed by other provider but has not been using. He does inquire about TKA in the future. Patient is a 70 y/o Trinidadian speaking male . Patient c/o left knee pain , he is having trouble doing daily activity's walking and lifting his legs . Patient pain level  ranges from 4/10 to 8/10 with relief from prescribed medication naproxen. Patient states he had a MRI as per patient his findings of " bone on bone . Patient presents to PST for evaluation for a left knee replacement with Dr Abreu on 09/01/20 Patient is a 70 y/o [place] speaking male . Patient c/o left knee pain walks with a cane. Patient states  he is having trouble doing daily activity's walking and lifting his legs . Patient pain level ranges from 4/10 to 8/10 with relief from prescribed medication naproxen. Patient states he had a MRI as per patient his findings of " bone on bone . Patient presents to PST for evaluation for a left knee replacement with Dr Abreu on 09/01/20   BMI  31.8  Mallampati  3

## 2020-08-11 NOTE — PATIENT PROFILE ADULT - VISION (WITH CORRECTIVE LENSES IF THE PATIENT USUALLY WEARS THEM):
glasses - progressive lenses/Partially impaired: cannot see medication labels or newsprint, but can see obstacles in path, and the surrounding layout; can count fingers at arm's length

## 2020-08-11 NOTE — H&P PST ADULT - NSICDXPASTSURGICALHX_GEN_ALL_CORE_FT
PAST SURGICAL HISTORY:  History of cornea transplant 2002    Status post open reduction with internal fixation of fracture left arm 2013

## 2020-08-11 NOTE — H&P PST ADULT - NSANTHOSAYNRD_GEN_A_CORE
No. HITSEH screening performed.  STOP BANG Legend: 0-2 = LOW Risk; 3-4 = INTERMEDIATE Risk; 5-8 = HIGH Risk

## 2020-08-11 NOTE — H&P PST ADULT - NSICDXPROBLEM_GEN_ALL_CORE_FT
PROBLEM DIAGNOSES  Problem: Unilateral primary osteoarthritis, left knee  Assessment and Plan: pt is having a left knee replacement with Dr Abreu     Problem: Hyperlipidemia  Assessment and Plan: pt is going for medical clearance with Dr Waite     Problem: Need for prophylactic measure  Assessment and Plan: caprini score is 5 moderate VTE risk SCD's ordered surgical team to assess the need for pharm proph

## 2020-08-26 DIAGNOSIS — Z01.818 ENCOUNTER FOR OTHER PREPROCEDURAL EXAMINATION: ICD-10-CM

## 2020-08-29 ENCOUNTER — APPOINTMENT (OUTPATIENT)
Dept: DISASTER EMERGENCY | Facility: CLINIC | Age: 70
End: 2020-08-29

## 2020-08-30 LAB — SARS-COV-2 N GENE NPH QL NAA+PROBE: NOT DETECTED

## 2020-08-31 ENCOUNTER — TRANSCRIPTION ENCOUNTER (OUTPATIENT)
Age: 70
End: 2020-08-31

## 2020-09-01 ENCOUNTER — TRANSCRIPTION ENCOUNTER (OUTPATIENT)
Age: 70
End: 2020-09-01

## 2020-09-01 ENCOUNTER — INPATIENT (INPATIENT)
Facility: HOSPITAL | Age: 70
LOS: 1 days | Discharge: HOME | DRG: 470 | End: 2020-09-03
Attending: ORTHOPAEDIC SURGERY | Admitting: ORTHOPAEDIC SURGERY
Payer: COMMERCIAL

## 2020-09-01 ENCOUNTER — APPOINTMENT (OUTPATIENT)
Dept: ORTHOPEDIC SURGERY | Facility: HOSPITAL | Age: 70
End: 2020-09-01

## 2020-09-01 VITALS
HEIGHT: 64 IN | TEMPERATURE: 97 F | RESPIRATION RATE: 15 BRPM | OXYGEN SATURATION: 100 % | HEART RATE: 80 BPM | DIASTOLIC BLOOD PRESSURE: 79 MMHG | WEIGHT: 186.07 LBS | SYSTOLIC BLOOD PRESSURE: 158 MMHG

## 2020-09-01 DIAGNOSIS — Z94.7 CORNEAL TRANSPLANT STATUS: Chronic | ICD-10-CM

## 2020-09-01 DIAGNOSIS — M17.12 UNILATERAL PRIMARY OSTEOARTHRITIS, LEFT KNEE: ICD-10-CM

## 2020-09-01 DIAGNOSIS — Z96.7 PRESENCE OF OTHER BONE AND TENDON IMPLANTS: Chronic | ICD-10-CM

## 2020-09-01 LAB
ABO RH CONFIRMATION: SIGNIFICANT CHANGE UP
GLUCOSE BLDC GLUCOMTR-MCNC: 76 MG/DL — SIGNIFICANT CHANGE UP (ref 70–99)
GLUCOSE BLDC GLUCOMTR-MCNC: 76 MG/DL — SIGNIFICANT CHANGE UP (ref 70–99)
GLUCOSE BLDC GLUCOMTR-MCNC: 94 MG/DL — SIGNIFICANT CHANGE UP (ref 70–99)

## 2020-09-01 PROCEDURE — 73560 X-RAY EXAM OF KNEE 1 OR 2: CPT | Mod: 26,LT

## 2020-09-01 PROCEDURE — 27447 TOTAL KNEE ARTHROPLASTY: CPT | Mod: LT

## 2020-09-01 PROCEDURE — 99222 1ST HOSP IP/OBS MODERATE 55: CPT

## 2020-09-01 PROCEDURE — 20985 CPTR-ASST DIR MS PX: CPT

## 2020-09-01 PROCEDURE — 27447 TOTAL KNEE ARTHROPLASTY: CPT | Mod: AS,LT

## 2020-09-01 RX ORDER — INFLUENZA VIRUS VACCINE 15; 15; 15; 15 UG/.5ML; UG/.5ML; UG/.5ML; UG/.5ML
0.5 SUSPENSION INTRAMUSCULAR ONCE
Refills: 0 | Status: DISCONTINUED | OUTPATIENT
Start: 2020-09-01 | End: 2020-09-03

## 2020-09-01 RX ORDER — FENTANYL CITRATE 50 UG/ML
50 INJECTION INTRAVENOUS
Refills: 0 | Status: DISCONTINUED | OUTPATIENT
Start: 2020-09-01 | End: 2020-09-01

## 2020-09-01 RX ORDER — HYDROMORPHONE HYDROCHLORIDE 2 MG/ML
4 INJECTION INTRAMUSCULAR; INTRAVENOUS; SUBCUTANEOUS
Refills: 0 | Status: DISCONTINUED | OUTPATIENT
Start: 2020-09-01 | End: 2020-09-03

## 2020-09-01 RX ORDER — ONDANSETRON 8 MG/1
4 TABLET, FILM COATED ORAL EVERY 6 HOURS
Refills: 0 | Status: DISCONTINUED | OUTPATIENT
Start: 2020-09-01 | End: 2020-09-03

## 2020-09-01 RX ORDER — POLYETHYLENE GLYCOL 3350 17 G/17G
17 POWDER, FOR SOLUTION ORAL DAILY
Refills: 0 | Status: DISCONTINUED | OUTPATIENT
Start: 2020-09-01 | End: 2020-09-03

## 2020-09-01 RX ORDER — OXYCODONE HYDROCHLORIDE 5 MG/1
5 TABLET ORAL
Refills: 0 | Status: DISCONTINUED | OUTPATIENT
Start: 2020-09-01 | End: 2020-09-03

## 2020-09-01 RX ORDER — PANTOPRAZOLE SODIUM 20 MG/1
40 TABLET, DELAYED RELEASE ORAL
Refills: 0 | Status: DISCONTINUED | OUTPATIENT
Start: 2020-09-01 | End: 2020-09-03

## 2020-09-01 RX ORDER — TAMSULOSIN HYDROCHLORIDE 0.4 MG/1
1 CAPSULE ORAL
Qty: 0 | Refills: 0 | DISCHARGE

## 2020-09-01 RX ORDER — KETOROLAC TROMETHAMINE 30 MG/ML
15 SYRINGE (ML) INJECTION EVERY 6 HOURS
Refills: 0 | Status: DISCONTINUED | OUTPATIENT
Start: 2020-09-01 | End: 2020-09-02

## 2020-09-01 RX ORDER — ONDANSETRON 8 MG/1
4 TABLET, FILM COATED ORAL ONCE
Refills: 0 | Status: DISCONTINUED | OUTPATIENT
Start: 2020-09-01 | End: 2020-09-01

## 2020-09-01 RX ORDER — MAGNESIUM HYDROXIDE 400 MG/1
30 TABLET, CHEWABLE ORAL DAILY
Refills: 0 | Status: DISCONTINUED | OUTPATIENT
Start: 2020-09-01 | End: 2020-09-03

## 2020-09-01 RX ORDER — RIVAROXABAN 15 MG-20MG
10 KIT ORAL DAILY
Refills: 0 | Status: DISCONTINUED | OUTPATIENT
Start: 2020-09-02 | End: 2020-09-03

## 2020-09-01 RX ORDER — SODIUM CHLORIDE 9 MG/ML
1000 INJECTION, SOLUTION INTRAVENOUS
Refills: 0 | Status: DISCONTINUED | OUTPATIENT
Start: 2020-09-01 | End: 2020-09-01

## 2020-09-01 RX ORDER — CEFAZOLIN SODIUM 1 G
2000 VIAL (EA) INJECTION ONCE
Refills: 0 | Status: DISCONTINUED | OUTPATIENT
Start: 2020-09-01 | End: 2020-09-01

## 2020-09-01 RX ORDER — APREPITANT 80 MG/1
40 CAPSULE ORAL ONCE
Refills: 0 | Status: COMPLETED | OUTPATIENT
Start: 2020-09-01 | End: 2020-09-01

## 2020-09-01 RX ORDER — HYDROMORPHONE HYDROCHLORIDE 2 MG/ML
0.5 INJECTION INTRAMUSCULAR; INTRAVENOUS; SUBCUTANEOUS
Refills: 0 | Status: DISCONTINUED | OUTPATIENT
Start: 2020-09-01 | End: 2020-09-01

## 2020-09-01 RX ORDER — ACETAMINOPHEN 500 MG
975 TABLET ORAL EVERY 8 HOURS
Refills: 0 | Status: DISCONTINUED | OUTPATIENT
Start: 2020-09-01 | End: 2020-09-03

## 2020-09-01 RX ORDER — CELECOXIB 200 MG/1
400 CAPSULE ORAL ONCE
Refills: 0 | Status: COMPLETED | OUTPATIENT
Start: 2020-09-01 | End: 2020-09-01

## 2020-09-01 RX ORDER — CEFAZOLIN SODIUM 1 G
2000 VIAL (EA) INJECTION
Refills: 0 | Status: COMPLETED | OUTPATIENT
Start: 2020-09-01 | End: 2020-09-02

## 2020-09-01 RX ORDER — OXYCODONE HYDROCHLORIDE 5 MG/1
10 TABLET ORAL
Refills: 0 | Status: DISCONTINUED | OUTPATIENT
Start: 2020-09-01 | End: 2020-09-03

## 2020-09-01 RX ORDER — TRANEXAMIC ACID 100 MG/ML
1000 INJECTION, SOLUTION INTRAVENOUS ONCE
Refills: 0 | Status: DISCONTINUED | OUTPATIENT
Start: 2020-09-01 | End: 2020-09-01

## 2020-09-01 RX ORDER — SODIUM CHLORIDE 9 MG/ML
1000 INJECTION, SOLUTION INTRAVENOUS
Refills: 0 | Status: DISCONTINUED | OUTPATIENT
Start: 2020-09-01 | End: 2020-09-03

## 2020-09-01 RX ORDER — TAMSULOSIN HYDROCHLORIDE 0.4 MG/1
0.4 CAPSULE ORAL AT BEDTIME
Refills: 0 | Status: DISCONTINUED | OUTPATIENT
Start: 2020-09-01 | End: 2020-09-03

## 2020-09-01 RX ORDER — HYDROMORPHONE HYDROCHLORIDE 2 MG/ML
0.5 INJECTION INTRAMUSCULAR; INTRAVENOUS; SUBCUTANEOUS
Refills: 0 | Status: DISCONTINUED | OUTPATIENT
Start: 2020-09-01 | End: 2020-09-03

## 2020-09-01 RX ORDER — SENNA PLUS 8.6 MG/1
2 TABLET ORAL AT BEDTIME
Refills: 0 | Status: DISCONTINUED | OUTPATIENT
Start: 2020-09-01 | End: 2020-09-03

## 2020-09-01 RX ORDER — CELECOXIB 200 MG/1
200 CAPSULE ORAL
Refills: 0 | Status: DISCONTINUED | OUTPATIENT
Start: 2020-09-02 | End: 2020-09-03

## 2020-09-01 RX ORDER — ACETAMINOPHEN 500 MG
975 TABLET ORAL ONCE
Refills: 0 | Status: COMPLETED | OUTPATIENT
Start: 2020-09-01 | End: 2020-09-01

## 2020-09-01 RX ORDER — SODIUM CHLORIDE 9 MG/ML
500 INJECTION INTRAMUSCULAR; INTRAVENOUS; SUBCUTANEOUS ONCE
Refills: 0 | Status: DISCONTINUED | OUTPATIENT
Start: 2020-09-01 | End: 2020-09-03

## 2020-09-01 RX ADMIN — Medication 975 MILLIGRAM(S): at 21:26

## 2020-09-01 RX ADMIN — TAMSULOSIN HYDROCHLORIDE 0.4 MILLIGRAM(S): 0.4 CAPSULE ORAL at 21:26

## 2020-09-01 RX ADMIN — Medication 15 MILLIGRAM(S): at 23:43

## 2020-09-01 RX ADMIN — Medication 975 MILLIGRAM(S): at 21:28

## 2020-09-01 RX ADMIN — SODIUM CHLORIDE 3 MILLILITER(S): 9 INJECTION INTRAMUSCULAR; INTRAVENOUS; SUBCUTANEOUS at 21:28

## 2020-09-01 RX ADMIN — Medication 100 MILLIGRAM(S): at 12:46

## 2020-09-01 RX ADMIN — Medication 1 TABLET(S): at 19:15

## 2020-09-01 RX ADMIN — Medication 975 MILLIGRAM(S): at 09:58

## 2020-09-01 RX ADMIN — Medication 15 MILLIGRAM(S): at 19:16

## 2020-09-01 RX ADMIN — Medication 15 MILLIGRAM(S): at 23:41

## 2020-09-01 RX ADMIN — Medication 100 MILLIGRAM(S): at 21:26

## 2020-09-01 RX ADMIN — APREPITANT 40 MILLIGRAM(S): 80 CAPSULE ORAL at 09:58

## 2020-09-01 RX ADMIN — CELECOXIB 400 MILLIGRAM(S): 200 CAPSULE ORAL at 09:58

## 2020-09-01 NOTE — CONSULT NOTE ADULT - SUBJECTIVE AND OBJECTIVE BOX
chief complaint:  left knee pain       HPI:  70 y/o Guatemalan speaking male with PMH of HPL, prostate enlarged, Hx DVT,  chronic  left knee replacement       PAST MEDICAL & SURGICAL HISTORY:  Hyperlipidemia  Prostate cancer: 2016  History of cornea transplant: 2002  Status post open reduction with internal fixation of fracture: left arm 2013      Social History:  Tabacco - denies  ETOH - denies  Illicit drug abuse - denies    FAMILY HISTORY:  FH: heart disease  Family history of malignant neoplasm of pancreas      Allergies    No Known Allergies    Intolerances        HOME MEDICATIONS :   Home Medications:  Flomax: 1 cap(s) orally once a day (01 Sep 2020 09:36)  naproxen 500 mg oral tablet: 1 tab(s) orally 2 times a day (01 Sep 2020 09:36)      REVIEW OF SYSTEMS:    CONSTITUTIONAL: No fever, weight loss, or fatigue  EYES: No eye pain, visual disturbances, or discharge  NECK: No pain or stiffness  RESPIRATORY: No cough, wheezing, chills or hemoptysis; No shortness of breath  CARDIOVASCULAR: No chest pain, palpitations, dizziness, or leg swelling  GASTROINTESTINAL: No abdominal or epigastric pain. No nausea, vomiting, or hematemesis; No diarrhea or constipation. No melena or hematochezia.  GENITOURINARY: No dysuria, frequency, hematuria, or incontinence  NEUROLOGICAL: No headaches, memory loss, loss of strength, numbness, or tremors  SKIN: No itching, burning, rashes, or lesions   LYMPH NODES: No enlarged glands  ENDOCRINE: No heat or cold intolerance; No hair loss  MUSCULOSKELETAL:   PSYCHIATRIC: No depression, anxiety, mood swings, or difficulty sleeping  HEME/LYMPH: No easy bruising, or bleeding gums  ALLERGY AND IMMUNOLOGIC: No hives or eczema    MEDICATIONS  (STANDING):  lactated ringers. 1000 milliLiter(s) (75 mL/Hr) IV Continuous <Continuous>    MEDICATIONS  (PRN):  fentaNYL    Injectable 50 MICROGram(s) IV Push every 5 minutes PRN Severe Pain (7 - 10)  HYDROmorphone  Injectable 0.5 milliGRAM(s) IV Push every 10 minutes PRN Moderate Pain (4 - 6)  ondansetron Injectable 4 milliGRAM(s) IV Push once PRN Nausea and/or Vomiting      Vital Signs Last 24 Hrs  T(C): 36.3 (01 Sep 2020 16:16), Max: 36.3 (01 Sep 2020 09:21)  T(F): 97.4 (01 Sep 2020 16:16), Max: 97.4 (01 Sep 2020 09:21)  HR: 65 (01 Sep 2020 16:55) (65 - 80)  BP: 117/64 (01 Sep 2020 16:55) (103/60 - 158/79)  BP(mean): --  RR: 14 (01 Sep 2020 16:55) (12 - 20)  SpO2: 100% (01 Sep 2020 16:55) (100% - 100%)    PHYSICAL EXAM:    GENERAL: NAD, well-groomed, well-developed  HEAD:  Atraumatic, Normocephalic  EYES: EOMI, PERRLA, conjunctiva and sclera clear  NECK: Supple, No JVD, Normal thyroid  NERVOUS SYSTEM:  Alert & Oriented X3, Good concentration; Motor Strength 5/5 B/L upper and lower extremities; DTRs 2+ intact and symmetric  CHEST/LUNG: CTA  b/l,  no rales, rhonchi, wheezing, or rubs  HEART: Regular rate and rhythm; No murmurs, rubs, or gallops  ABDOMEN: Soft, Nontender, Nondistended; Bowel sounds present  EXTREMITIES:  2+ Peripheral Pulses, No clubbing, cyanosis, or edema ,   LYMPH: No lymphadenopathy noted  SKIN: No rashes or lesions    LABS:                  RADIOLOGY & ADDITIONAL STUDIES: chief complaint:  left knee pain     Wallisian translation - done by PACU RN     HPI:  68 y/o Thai speaking male, poor historian with PMH of HPL, prostate enlarged, Hx DVT in Dec 2017 was on xarelto, however lost to follow up with chronic left knee pain limiting his daily activities with failed conservative medical therapies presented for an elective left knee replacement. He is now s/p Left knee arthroplasty POD#0, seen in PACU doing well. He recently had some oral and cervical pain 3-4 days ago, was started on Augmentin 875mg bid x 10days on 9/27 for a possible dental abscess. pain has improved, denies any swallowing difficulty.        PAST MEDICAL & SURGICAL HISTORY:  Hyperlipidemia  Prostate cancer: 2016  History of cornea transplant: 2002  Status post open reduction with internal fixation of fracture: left arm 2013      Social History:  Tabacco - denies  ETOH - denies  Illicit drug abuse - denies    FAMILY HISTORY:  FH: heart disease  Family history of malignant neoplasm of pancreas      Allergies    No Known Allergies    Intolerances        HOME MEDICATIONS :   Home Medications:  Flomax: 1 cap(s) orally once a day (01 Sep 2020 09:36)  naproxen 500 mg oral tablet: 1 tab(s) orally 2 times a day (01 Sep 2020 09:36)      REVIEW OF SYSTEMS:    CONSTITUTIONAL: No fever, weight loss, or fatigue  EYES: No eye pain, visual disturbances, or discharge  NECK: No pain or stiffness  RESPIRATORY: No cough, wheezing, chills or hemoptysis; No shortness of breath  CARDIOVASCULAR: No chest pain, palpitations, dizziness, or leg swelling  GASTROINTESTINAL: No abdominal or epigastric pain. No nausea, vomiting.  GENITOURINARY: No dysuria, frequency, hematuria, or incontinence  NEUROLOGICAL: No headaches, memory loss, loss of strength, numbness, or tremors  SKIN: No itching, burning, rashes, or lesions   LYMPH NODES: No enlarged glands  ENDOCRINE: No heat or cold intolerance; No hair loss  MUSCULOSKELETAL:       MEDICATIONS  (STANDING):  lactated ringers. 1000 milliLiter(s) (75 mL/Hr) IV Continuous <Continuous>    MEDICATIONS  (PRN):  fentaNYL    Injectable 50 MICROGram(s) IV Push every 5 minutes PRN Severe Pain (7 - 10)  HYDROmorphone  Injectable 0.5 milliGRAM(s) IV Push every 10 minutes PRN Moderate Pain (4 - 6)  ondansetron Injectable 4 milliGRAM(s) IV Push once PRN Nausea and/or Vomiting      Vital Signs Last 24 Hrs  T(C): 36.3 (01 Sep 2020 16:16), Max: 36.3 (01 Sep 2020 09:21)  T(F): 97.4 (01 Sep 2020 16:16), Max: 97.4 (01 Sep 2020 09:21)  HR: 65 (01 Sep 2020 16:55) (65 - 80)  BP: 117/64 (01 Sep 2020 16:55) (103/60 - 158/79)  BP(mean): --  RR: 14 (01 Sep 2020 16:55) (12 - 20)  SpO2: 100% (01 Sep 2020 16:55) (100% - 100%)    PHYSICAL EXAM:    GENERAL: NAD, well-groomed, well-developed  HEAD:  Atraumatic, Normocephalic  EYES: EOMI, PERRLA, conjunctiva and sclera clear  NECK: Supple  NERVOUS SYSTEM:  Alert & Oriented X3, Good concentration  CHEST/LUNG: CTA  b/l,  no rales, rhonchi  HEART: Regular rate and rhythm; No murmurs  EXTREMITIES:   No clubbing, cyanosis, or edema   SKIN: No rashes or lesions    LABS:    Reviewed         RADIOLOGY & ADDITIONAL STUDIES:    Office notes from PMD reviewed

## 2020-09-01 NOTE — DISCHARGE NOTE PROVIDER - NSDCMRMEDTOKEN_GEN_ALL_CORE_FT
Flomax: 1 cap(s) orally once a day  naproxen 500 mg oral tablet: 1 tab(s) orally 2 times a day acetaminophen 325 mg oral tablet: 3 tab(s) orally every 8 hours  amoxicillin-clavulanate 875 mg-125 mg oral tablet: 1 tab(s) orally 2 times a day as prescribed at home  celecoxib 200 mg oral capsule: 1 cap(s) orally 2 times a day MDD:2  Flomax: 1 cap(s) orally once a day  oxyCODONE 5 mg oral tablet: 1-2  tab(s) orally every 4 to 6 hours as needed for pain MDD:8  rivaroxaban 10 mg oral tablet: 1 tab(s) orally once a day MDD:1  Senna S 50 mg-8.6 mg oral tablet: 2 tab(s) orally once a day (at bedtime) MDD:2

## 2020-09-01 NOTE — DISCHARGE NOTE PROVIDER - NSDCFUSCHEDAPPT_GEN_ALL_CORE_FT
CANDACE SHINE ; 09/23/2020 ; [facility] [address] ; 10/20/2020 ; [facility] [address] ; 11/23/2020 ; [facility] [address] [facility] CANDACE SHINE ; 09/23/2020 ; [facility] [address] ; 10/20/2020 ; [facility] [address] ; 11/23/2020 ; [facility] [address] [facility]

## 2020-09-01 NOTE — DISCHARGE NOTE PROVIDER - NSDCFUADDINST_GEN_ALL_CORE_FT
The patient will be seen in the office between 2-3 weeks for wound check.   **Your first post-operative visit has been scheduled prior to your admission. PLEASE CONTACT OFFICE TO CONFIRM THE APPOINTMENT DATE. Sutures/Staples/Tape will be removed at that time.  **  The silver based dressing is to be removed 7 days from the date of surgery (9/8/20).   ** CONTACT THE OFFICE IF THE FOLLOWING DEVELOP:  - the dressing becomes soiled or saturated  - you develop a fever greater that 101F  - the wound becomes red or you develop blistering around the wound  * Patient may shower after post-op day #3.   * The patient will continue home PT consistent with  total knee replacement protocol. Transition to outpatient PT will occur at the time of the first office visit.   * The patient will practice knee extension exercises regularly to minimize hamstring contraction.   * The patient is FULL weight bearing.  *** The patient will continue XARELTO for blood clot prevention.    * The patient will take OXYCODONE AND TYLENOL for pain control and adjust according to prescription and patient needs. Contact the office if pain increases while taking prescribed pain medications or related concerns develop.  * Celebrex will be taken twice daily for 3 weeks for pain control and prevention of excessive bone growth. Additional prescription may be requested at your office follow-up visit.   * The patient will take Senna S while taking oxycodone to prevent narcotic associated constipation.  Additionally, increase water intake (drink at least 8 glasses of water daily) and try adding fiber to the diet by eating fruits, vegetables and foods that are rich in grains. If constipation is experienced, contact the medical/primary care provider to discuss further treatment options.  * To avoid injury at home:  - continue use of rolling walker until cleared by physical therapist  - have family or friend remove all throw rug or objects in hallways that may present a trip hazard.  - if you experience any dizziness or medical concerns, call your medical doctor or  911.  * The implant may activate metal detection devices.   * Patient will continue AUGMENTIN as previously prescribed by pts dentist.

## 2020-09-01 NOTE — DISCHARGE NOTE PROVIDER - CARE PROVIDER_API CALL
Jose Abreu  ORTHOPAEDIC SURGERY  [address] [facility], [facility] [address]  [place] [address]  Phone: [phone]  Fax: [phone]  Follow Up Time:

## 2020-09-01 NOTE — CONSULT NOTE ADULT - ASSESSMENT
68 y/o Belarusian speaking male, poor historian with PMH of HPL, prostate enlarged, Hx DVT in Dec 2017 was on xarelto, however lost to follow up with chronic left knee pain limiting his daily activities with failed conservative medical therapies presented for an elective left knee replacement. He is now s/p Left knee arthroplasty. He recently had some oral and cervical pain 3-4 days ago, was started on Augmentin 875mg bid x 10days on 9/27 for a possible dental abscess. pain has improved, denies any swallowing difficulty.      # Left knee OA - s/p Lt TKR   # Lt total knee replacement - Pain control   Bowel regimen   Incentive spirometry  DVT px - per ortho   PT    # BPH - on flomax   # HTN - on hydrochlorthiazide 25mg daily - hold for today - will resume with holding parameters on day 2  # Hx DVT - lost to follow up, was started on Xarelto in Dec 2017 , however pt never picked up his meds for Jan 2018 - lost to follow up - possibly unprovoked DVT - receommend Pharmacologic DVT px   # Dental abscess - ct augmentin 875mg bid x 10days course - f/u with PMD     called pharmacy - home medications were confirmed   Labs in am   Will follow

## 2020-09-01 NOTE — PHYSICAL THERAPY INITIAL EVALUATION ADULT - GENERAL OBSERVATIONS, REHAB EVAL
Pt received in stretcher in PACU, + IV Loc, +Tele, + bilateral venous compression boots, breathing on RA in NAD, in 0/10 pain, agreeable to PT evaluation. Saba Clerk Antonio & RN Don present throughout to assist in Japanese translation

## 2020-09-01 NOTE — DISCHARGE NOTE PROVIDER - HOSPITAL COURSE
The patient underwent a LEFT TOTAL KNEE REPLACEMENT on 9/1/20. The patient received antibiotics consistent with SCIP guidelines. The patient underwent the procedure and had no intra-operative complications. Post-operatively, the patient was seen by medicine and PT. The patient received XARELTO for clot prevention. The patient received pain medications per orthopedic pain managment pathway and the pain was appropriately controlled. The patient did not have any post-operative medical complications. The patient was discharged in stable condition.

## 2020-09-01 NOTE — PHYSICAL THERAPY INITIAL EVALUATION ADULT - ADL SKILLS, REHAB EVAL
DME rx, consult, & pt reg facesheet faxed to [facility] @ #[phone]. Confirmation rcvd. Pt notified of the previous. Explained rx may have not been picked up by our liaison @ [facility] yet since it was generated yesterday.  Instructed her to contact us if we may be independent

## 2020-09-01 NOTE — PHYSICAL THERAPY INITIAL EVALUATION ADULT - ADDITIONAL COMMENTS
Pt reports living in a house with his son and daughter who both work full time unavailable to assist during the day. Pt has 5 steps to enter with handrail and no stairs inside all needs are met on the first floor. Pt owns Cane, RW, Rollator, and raised toilet seat. Pt independent prior to admission. Pt is retired & drives.

## 2020-09-02 ENCOUNTER — TRANSCRIPTION ENCOUNTER (OUTPATIENT)
Age: 70
End: 2020-09-02

## 2020-09-02 LAB
ANION GAP SERPL CALC-SCNC: 7 MMOL/L — SIGNIFICANT CHANGE UP (ref 5–17)
BUN SERPL-MCNC: 16 MG/DL — SIGNIFICANT CHANGE UP (ref 8–20)
CALCIUM SERPL-MCNC: 8.7 MG/DL — SIGNIFICANT CHANGE UP (ref 8.6–10.2)
CHLORIDE SERPL-SCNC: 104 MMOL/L — SIGNIFICANT CHANGE UP (ref 98–107)
CO2 SERPL-SCNC: 27 MMOL/L — SIGNIFICANT CHANGE UP (ref 22–29)
CREAT SERPL-MCNC: 0.73 MG/DL — SIGNIFICANT CHANGE UP (ref 0.5–1.3)
GLUCOSE SERPL-MCNC: 95 MG/DL — SIGNIFICANT CHANGE UP (ref 70–99)
HCT VFR BLD CALC: 33.6 % — LOW (ref 39–50)
HGB BLD-MCNC: 11.1 G/DL — LOW (ref 13–17)
MCHC RBC-ENTMCNC: 30.2 PG — SIGNIFICANT CHANGE UP (ref 27–34)
MCHC RBC-ENTMCNC: 33 GM/DL — SIGNIFICANT CHANGE UP (ref 32–36)
MCV RBC AUTO: 91.3 FL — SIGNIFICANT CHANGE UP (ref 80–100)
PLATELET # BLD AUTO: 169 K/UL — SIGNIFICANT CHANGE UP (ref 150–400)
POTASSIUM SERPL-MCNC: 4.1 MMOL/L — SIGNIFICANT CHANGE UP (ref 3.5–5.3)
POTASSIUM SERPL-SCNC: 4.1 MMOL/L — SIGNIFICANT CHANGE UP (ref 3.5–5.3)
RBC # BLD: 3.68 M/UL — LOW (ref 4.2–5.8)
RBC # FLD: 14 % — SIGNIFICANT CHANGE UP (ref 10.3–14.5)
SODIUM SERPL-SCNC: 138 MMOL/L — SIGNIFICANT CHANGE UP (ref 135–145)
WBC # BLD: 6.85 K/UL — SIGNIFICANT CHANGE UP (ref 3.8–10.5)
WBC # FLD AUTO: 6.85 K/UL — SIGNIFICANT CHANGE UP (ref 3.8–10.5)

## 2020-09-02 PROCEDURE — 99232 SBSQ HOSP IP/OBS MODERATE 35: CPT

## 2020-09-02 RX ORDER — HYDROCHLOROTHIAZIDE 25 MG
25 TABLET ORAL DAILY
Refills: 0 | Status: DISCONTINUED | OUTPATIENT
Start: 2020-09-03 | End: 2020-09-03

## 2020-09-02 RX ADMIN — CELECOXIB 200 MILLIGRAM(S): 200 CAPSULE ORAL at 18:14

## 2020-09-02 RX ADMIN — CELECOXIB 200 MILLIGRAM(S): 200 CAPSULE ORAL at 19:29

## 2020-09-02 RX ADMIN — Medication 15 MILLIGRAM(S): at 12:20

## 2020-09-02 RX ADMIN — OXYCODONE HYDROCHLORIDE 10 MILLIGRAM(S): 5 TABLET ORAL at 13:13

## 2020-09-02 RX ADMIN — Medication 15 MILLIGRAM(S): at 12:30

## 2020-09-02 RX ADMIN — Medication 975 MILLIGRAM(S): at 21:38

## 2020-09-02 RX ADMIN — SODIUM CHLORIDE 75 MILLILITER(S): 9 INJECTION, SOLUTION INTRAVENOUS at 05:59

## 2020-09-02 RX ADMIN — Medication 15 MILLIGRAM(S): at 05:56

## 2020-09-02 RX ADMIN — POLYETHYLENE GLYCOL 3350 17 GRAM(S): 17 POWDER, FOR SOLUTION ORAL at 12:18

## 2020-09-02 RX ADMIN — OXYCODONE HYDROCHLORIDE 10 MILLIGRAM(S): 5 TABLET ORAL at 19:29

## 2020-09-02 RX ADMIN — Medication 975 MILLIGRAM(S): at 13:13

## 2020-09-02 RX ADMIN — Medication 975 MILLIGRAM(S): at 12:18

## 2020-09-02 RX ADMIN — OXYCODONE HYDROCHLORIDE 10 MILLIGRAM(S): 5 TABLET ORAL at 18:14

## 2020-09-02 RX ADMIN — Medication 975 MILLIGRAM(S): at 05:54

## 2020-09-02 RX ADMIN — OXYCODONE HYDROCHLORIDE 10 MILLIGRAM(S): 5 TABLET ORAL at 05:59

## 2020-09-02 RX ADMIN — Medication 1 TABLET(S): at 05:54

## 2020-09-02 RX ADMIN — CELECOXIB 200 MILLIGRAM(S): 200 CAPSULE ORAL at 05:55

## 2020-09-02 RX ADMIN — Medication 1 TABLET(S): at 18:13

## 2020-09-02 RX ADMIN — OXYCODONE HYDROCHLORIDE 10 MILLIGRAM(S): 5 TABLET ORAL at 06:59

## 2020-09-02 RX ADMIN — Medication 15 MILLIGRAM(S): at 05:55

## 2020-09-02 RX ADMIN — SODIUM CHLORIDE 3 MILLILITER(S): 9 INJECTION INTRAMUSCULAR; INTRAVENOUS; SUBCUTANEOUS at 05:56

## 2020-09-02 RX ADMIN — PANTOPRAZOLE SODIUM 40 MILLIGRAM(S): 20 TABLET, DELAYED RELEASE ORAL at 05:55

## 2020-09-02 RX ADMIN — SODIUM CHLORIDE 3 MILLILITER(S): 9 INJECTION INTRAMUSCULAR; INTRAVENOUS; SUBCUTANEOUS at 21:38

## 2020-09-02 RX ADMIN — Medication 100 MILLIGRAM(S): at 05:55

## 2020-09-02 RX ADMIN — RIVAROXABAN 10 MILLIGRAM(S): KIT at 12:18

## 2020-09-02 RX ADMIN — TAMSULOSIN HYDROCHLORIDE 0.4 MILLIGRAM(S): 0.4 CAPSULE ORAL at 21:38

## 2020-09-02 RX ADMIN — Medication 975 MILLIGRAM(S): at 05:56

## 2020-09-02 RX ADMIN — OXYCODONE HYDROCHLORIDE 10 MILLIGRAM(S): 5 TABLET ORAL at 12:18

## 2020-09-02 RX ADMIN — SODIUM CHLORIDE 3 MILLILITER(S): 9 INJECTION INTRAMUSCULAR; INTRAVENOUS; SUBCUTANEOUS at 12:14

## 2020-09-02 RX ADMIN — CELECOXIB 200 MILLIGRAM(S): 200 CAPSULE ORAL at 05:56

## 2020-09-02 NOTE — DISCHARGE NOTE NURSING/CASE MANAGEMENT/SOCIAL WORK - PATIENT PORTAL LINK FT
You can access the FollowMyHealth Patient Portal offered by [facility] by registering at the following website: [email or web address]. By joining [facility]’s FollowMyHealth portal, you will also be able to view your health information using other applications (apps) compatible with our system.

## 2020-09-02 NOTE — OCCUPATIONAL THERAPY INITIAL EVALUATION ADULT - PLANNED THERAPY INTERVENTIONS, OT EVAL
balance training/neuromuscular re-education/strengthening/ADL retraining/bed mobility training/motor coordination training/transfer training

## 2020-09-02 NOTE — OCCUPATIONAL THERAPY INITIAL EVALUATION ADULT - LIVES WITH, PROFILE
pt lives alone, but will be going to his son and daughter's private house with 5-6 steps to enter with railing/children

## 2020-09-03 VITALS
OXYGEN SATURATION: 97 % | DIASTOLIC BLOOD PRESSURE: 82 MMHG | HEART RATE: 97 BPM | RESPIRATION RATE: 18 BRPM | SYSTOLIC BLOOD PRESSURE: 149 MMHG | TEMPERATURE: 98 F

## 2020-09-03 LAB
ANION GAP SERPL CALC-SCNC: 9 MMOL/L — SIGNIFICANT CHANGE UP (ref 5–17)
BUN SERPL-MCNC: 22 MG/DL — HIGH (ref 8–20)
CALCIUM SERPL-MCNC: 8.5 MG/DL — LOW (ref 8.6–10.2)
CHLORIDE SERPL-SCNC: 104 MMOL/L — SIGNIFICANT CHANGE UP (ref 98–107)
CO2 SERPL-SCNC: 28 MMOL/L — SIGNIFICANT CHANGE UP (ref 22–29)
CREAT SERPL-MCNC: 0.91 MG/DL — SIGNIFICANT CHANGE UP (ref 0.5–1.3)
GLUCOSE SERPL-MCNC: 109 MG/DL — HIGH (ref 70–99)
HCT VFR BLD CALC: 34 % — LOW (ref 39–50)
HGB BLD-MCNC: 10.8 G/DL — LOW (ref 13–17)
MCHC RBC-ENTMCNC: 29.6 PG — SIGNIFICANT CHANGE UP (ref 27–34)
MCHC RBC-ENTMCNC: 31.8 GM/DL — LOW (ref 32–36)
MCV RBC AUTO: 93.2 FL — SIGNIFICANT CHANGE UP (ref 80–100)
PLATELET # BLD AUTO: 158 K/UL — SIGNIFICANT CHANGE UP (ref 150–400)
POTASSIUM SERPL-MCNC: 4.5 MMOL/L — SIGNIFICANT CHANGE UP (ref 3.5–5.3)
POTASSIUM SERPL-SCNC: 4.5 MMOL/L — SIGNIFICANT CHANGE UP (ref 3.5–5.3)
RBC # BLD: 3.65 M/UL — LOW (ref 4.2–5.8)
RBC # FLD: 14.3 % — SIGNIFICANT CHANGE UP (ref 10.3–14.5)
SODIUM SERPL-SCNC: 141 MMOL/L — SIGNIFICANT CHANGE UP (ref 135–145)
WBC # BLD: 8.05 K/UL — SIGNIFICANT CHANGE UP (ref 3.8–10.5)
WBC # FLD AUTO: 8.05 K/UL — SIGNIFICANT CHANGE UP (ref 3.8–10.5)

## 2020-09-03 PROCEDURE — S2900: CPT

## 2020-09-03 PROCEDURE — C1776: CPT

## 2020-09-03 PROCEDURE — 99232 SBSQ HOSP IP/OBS MODERATE 35: CPT

## 2020-09-03 PROCEDURE — 36415 COLL VENOUS BLD VENIPUNCTURE: CPT

## 2020-09-03 PROCEDURE — C1713: CPT

## 2020-09-03 PROCEDURE — 73560 X-RAY EXAM OF KNEE 1 OR 2: CPT

## 2020-09-03 PROCEDURE — 97116 GAIT TRAINING THERAPY: CPT

## 2020-09-03 PROCEDURE — 85027 COMPLETE CBC AUTOMATED: CPT

## 2020-09-03 PROCEDURE — 80048 BASIC METABOLIC PNL TOTAL CA: CPT

## 2020-09-03 PROCEDURE — 97110 THERAPEUTIC EXERCISES: CPT

## 2020-09-03 PROCEDURE — 82962 GLUCOSE BLOOD TEST: CPT

## 2020-09-03 PROCEDURE — 97163 PT EVAL HIGH COMPLEX 45 MIN: CPT

## 2020-09-03 PROCEDURE — 97167 OT EVAL HIGH COMPLEX 60 MIN: CPT

## 2020-09-03 RX ORDER — OXYCODONE HYDROCHLORIDE 5 MG/1
1 TABLET ORAL
Qty: 42 | Refills: 0
Start: 2020-09-03 | End: 2020-09-09

## 2020-09-03 RX ORDER — OXYCODONE 5 MG/1
5 TABLET ORAL
Qty: 30 | Refills: 0 | Status: ACTIVE | COMMUNITY
Start: 2020-09-03 | End: 1900-01-01

## 2020-09-03 RX ORDER — ACETAMINOPHEN 500 MG
3 TABLET ORAL
Qty: 0 | Refills: 0 | DISCHARGE
Start: 2020-09-03

## 2020-09-03 RX ORDER — SENNOSIDES/DOCUSATE SODIUM 8.6MG-50MG
2 TABLET ORAL
Qty: 14 | Refills: 0
Start: 2020-09-03 | End: 2020-09-09

## 2020-09-03 RX ORDER — RIVAROXABAN 15 MG-20MG
1 KIT ORAL
Qty: 28 | Refills: 0
Start: 2020-09-03 | End: 2020-09-30

## 2020-09-03 RX ORDER — CELECOXIB 200 MG/1
1 CAPSULE ORAL
Qty: 42 | Refills: 0
Start: 2020-09-03 | End: 2020-09-23

## 2020-09-03 RX ADMIN — CELECOXIB 200 MILLIGRAM(S): 200 CAPSULE ORAL at 05:54

## 2020-09-03 RX ADMIN — Medication 25 MILLIGRAM(S): at 05:53

## 2020-09-03 RX ADMIN — OXYCODONE HYDROCHLORIDE 10 MILLIGRAM(S): 5 TABLET ORAL at 10:44

## 2020-09-03 RX ADMIN — PANTOPRAZOLE SODIUM 40 MILLIGRAM(S): 20 TABLET, DELAYED RELEASE ORAL at 05:52

## 2020-09-03 RX ADMIN — Medication 975 MILLIGRAM(S): at 05:53

## 2020-09-03 RX ADMIN — RIVAROXABAN 10 MILLIGRAM(S): KIT at 12:46

## 2020-09-03 RX ADMIN — SODIUM CHLORIDE 3 MILLILITER(S): 9 INJECTION INTRAMUSCULAR; INTRAVENOUS; SUBCUTANEOUS at 05:54

## 2020-09-03 RX ADMIN — OXYCODONE HYDROCHLORIDE 10 MILLIGRAM(S): 5 TABLET ORAL at 09:45

## 2020-09-03 RX ADMIN — Medication 1 TABLET(S): at 05:52

## 2020-09-03 RX ADMIN — Medication 975 MILLIGRAM(S): at 05:54

## 2020-09-03 RX ADMIN — CELECOXIB 200 MILLIGRAM(S): 200 CAPSULE ORAL at 05:53

## 2020-09-03 NOTE — PROGRESS NOTE ADULT - ASSESSMENT
68 y/o Albanian speaking male, poor historian with PMH of HPL, prostate enlarged, Hx DVT in Dec 2017 was on xarelto, however lost to follow up with chronic left knee pain limiting his daily activities with failed conservative medical therapies presented for an elective left knee replacement. He is now s/p Left knee arthroplasty. He recently had some oral and cervical pain 3-4 days ago, was started on Augmentin 875mg bid x 10days on 9/27 for a possible dental abscess. pain has improved, denies any swallowing difficulty.      # Left knee OA - s/p Lt TKR   # Lt total knee replacement - Pain control   Bowel regimen   Incentive spirometry  DVT px - per ortho   PT    # acute blood loss anemia 2/2 post operative state - Hd stable.   # BPH - on flomax   # HTN - resume  hydrochlorthiazide 25mg daily  # Hx DVT - lost to follow up, was started on Xarelto in Dec 2017 , however pt never picked up his meds for Jan 2018 - lost to follow up - possibly unprovoked DVT - recommend Pharmacologic DVT px   # Dental abscess - ct augmentin 875mg bid x 10days course - f/u with PMD     Medically stable for discharge.
68 y/o Uzbek speaking male, poor historian with PMH of HPL, prostate enlarged, Hx DVT in Dec 2017 was on xarelto, however lost to follow up with chronic left knee pain limiting his daily activities with failed conservative medical therapies presented for an elective left knee replacement. He is now s/p Left knee arthroplasty. He recently had some oral and cervical pain 3-4 days ago, was started on Augmentin 875mg bid x 10days on 9/27 for a possible dental abscess. pain has improved, denies any swallowing difficulty.      # Left knee OA - s/p Lt TKR   # Lt total knee replacement - Pain control   Bowel regimen   Incentive spirometry  DVT px - per ortho   PT    # BPH - on flomax   # HTN - on hydrochlorthiazide 25mg daily - hold for today - will resume with holding parameters on day 2  # Hx DVT - lost to follow up, was started on Xarelto in Dec 2017 , however pt never picked up his meds for Jan 2018 - lost to follow up - possibly unprovoked DVT - receommend Pharmacologic DVT px   # Dental abscess - ct augmentin 875mg bid x 10days course - f/u with PMD     Will follow

## 2020-09-03 NOTE — PROGRESS NOTE ADULT - SUBJECTIVE AND OBJECTIVE BOX
CANDACE SHINE    [id number]    70y      Male    CC: Lt Knee pain s/p Lt TKR POD#1  Doing well, pain is fairly controlled, ambulated with PT, tolerating po, urinating without any issues. denies any light headedness/dizziness on ambulation      INTERVAL HPI/OVERNIGHT EVENTS: no acute events     REVIEW OF SYSTEMS:    CONSTITUTIONAL: No fever or fatigue  RESPIRATORY: No cough, wheezing, No shortness of breath  CARDIOVASCULAR: No chest pain, palpitations  GASTROINTESTINAL: No abdominal or epigastric pain. No nausea, vomiting        Vital Signs Last 24 Hrs  T(C): 36.8 (02 Sep 2020 07:55), Max: 36.8 (02 Sep 2020 07:55)  T(F): 98.3 (02 Sep 2020 07:55), Max: 98.3 (02 Sep 2020 07:55)  HR: 62 (02 Sep 2020 07:55) (47 - 78)  BP: 113/64 (02 Sep 2020 07:55) (103/60 - 159/73)  BP(mean): --  RR: 18 (02 Sep 2020 07:55) (11 - 20)  SpO2: 95% (02 Sep 2020 07:55) (95% - 100%)    PHYSICAL EXAM:    GENERAL: NAD, well-groomed  HEENT: PERRL, +EOMI  NECK: soft, Supple  CHEST/LUNG: Clear to percussion bilaterally; No wheezing  HEART: S1S2+, Regular rate and rhythm; No murmurs  EXTREMITIES:   No clubbing, cyanosis, or edema  SKIN: No rashes or lesions  NEURO: AAOX3      09-01 @ 07:01  -  09-02 @ 07:00  --------------------------------------------------------  IN: 2295 mL / OUT: 800 mL / NET: 1495 mL        LABS:                        11.1   6.85  )-----------( 169      ( 02 Sep 2020 05:51 )             33.6     09-02    138  |  104  |  16.0  ----------------------------<  95  4.1   |  27.0  |  0.73    Ca    8.7      02 Sep 2020 05:51              MEDICATIONS  (STANDING):  acetaminophen   Tablet .. 975 milliGRAM(s) Oral every 8 hours  amoxicillin  875 milliGRAM(s)/clavulanate 1 Tablet(s) Oral two times a day  celecoxib 200 milliGRAM(s) Oral two times a day  influenza   Vaccine 0.5 milliLiter(s) IntraMuscular once  lactated ringers. 1000 milliLiter(s) (75 mL/Hr) IV Continuous <Continuous>  pantoprazole    Tablet 40 milliGRAM(s) Oral before breakfast  polyethylene glycol 3350 17 Gram(s) Oral daily  rivaroxaban 10 milliGRAM(s) Oral daily  sodium chloride 0.9% Bolus 500 milliLiter(s) IV Bolus once  sodium chloride 0.9% lock flush 3 milliLiter(s) IV Push every 8 hours  tamsulosin 0.4 milliGRAM(s) Oral at bedtime    MEDICATIONS  (PRN):  aluminum hydroxide/magnesium hydroxide/simethicone Suspension 30 milliLiter(s) Oral four times a day PRN Indigestion  HYDROmorphone   Tablet 4 milliGRAM(s) Oral every 3 hours PRN Severe Pain (7 - 10)  HYDROmorphone  Injectable 0.5 milliGRAM(s) IV Push every 3 hours PRN breakthru  magnesium hydroxide Suspension 30 milliLiter(s) Oral daily PRN Constipation  ondansetron Injectable 4 milliGRAM(s) IV Push every 6 hours PRN Nausea and/or Vomiting  oxyCODONE    IR 5 milliGRAM(s) Oral every 3 hours PRN Mild Pain (1 - 3)  oxyCODONE    IR 10 milliGRAM(s) Oral every 3 hours PRN Moderate Pain (4 - 6)  senna 2 Tablet(s) Oral at bedtime PRN Constipation      RADIOLOGY & ADDITIONAL TESTS:
CANDACE SHINE  [id number]    History: 70y Male is status post left total knee arthroplasty on 9/1/20, POD # 1. Patient is doing well and is comfortable. The patient's pain is controlled using the prescribed pain medications. The patient is participating in physical therapy. Denies nausea, vomiting, chest pain, shortness of breath, abdominal pain or fever. No new complaints.    Vital Signs Last 24 Hrs  T(C): 36.4 (02 Sep 2020 04:45), Max: 36.7 (01 Sep 2020 18:35)  T(F): 97.6 (02 Sep 2020 04:45), Max: 98 (01 Sep 2020 18:35)  HR: 63 (02 Sep 2020 04:45) (47 - 80)  BP: 118/60 (02 Sep 2020 04:45) (103/60 - 159/73)  BP(mean): --  RR: 18 (02 Sep 2020 04:45) (11 - 20)  SpO2: 97% (02 Sep 2020 04:45) (97% - 100%)                        11.1   6.85  )-----------( 169      ( 02 Sep 2020 05:51 )             33.6     09-02    138  |  104  |  16.0  ----------------------------<  95  4.1   |  27.0  |  0.73    Ca    8.7      02 Sep 2020 05:51    MEDICATIONS  (STANDING):  acetaminophen   Tablet .. 975 milliGRAM(s) Oral every 8 hours  amoxicillin  875 milliGRAM(s)/clavulanate 1 Tablet(s) Oral two times a day  celecoxib 200 milliGRAM(s) Oral two times a day  influenza   Vaccine 0.5 milliLiter(s) IntraMuscular once  ketorolac   Injectable 15 milliGRAM(s) IV Push every 6 hours  lactated ringers. 1000 milliLiter(s) (75 mL/Hr) IV Continuous <Continuous>  pantoprazole    Tablet 40 milliGRAM(s) Oral before breakfast  polyethylene glycol 3350 17 Gram(s) Oral daily  rivaroxaban 10 milliGRAM(s) Oral daily  sodium chloride 0.9% Bolus 500 milliLiter(s) IV Bolus once  sodium chloride 0.9% lock flush 3 milliLiter(s) IV Push every 8 hours  tamsulosin 0.4 milliGRAM(s) Oral at bedtime    MEDICATIONS  (PRN):  aluminum hydroxide/magnesium hydroxide/simethicone Suspension 30 milliLiter(s) Oral four times a day PRN Indigestion  HYDROmorphone   Tablet 4 milliGRAM(s) Oral every 3 hours PRN Severe Pain (7 - 10)  HYDROmorphone  Injectable 0.5 milliGRAM(s) IV Push every 3 hours PRN breakthru  magnesium hydroxide Suspension 30 milliLiter(s) Oral daily PRN Constipation  ondansetron Injectable 4 milliGRAM(s) IV Push every 6 hours PRN Nausea and/or Vomiting  oxyCODONE    IR 5 milliGRAM(s) Oral every 3 hours PRN Mild Pain (1 - 3)  oxyCODONE    IR 10 milliGRAM(s) Oral every 3 hours PRN Moderate Pain (4 - 6)  senna 2 Tablet(s) Oral at bedtime PRN Constipation    Physical exam: The left knee dressing is clean, dry and intact. The calf is supple nontender. Passive range of motion is acceptable to due postoperative pain. Sensation to light touch is grossly intact distally. Motor function distally is 5/5. Extensor hallucis longus and flexor hallucis longus are intact. No foot drop. 2+ dorsalis pedis pulse. Capillary refill is less than 2 seconds. No cyanosis.    Primary Orthopedic Assessment:  •	s/p LEFT total knee replacement	    Plan:   •	DVT prophylaxis as prescribed, including use of compression devices and ankle pumps  •	Continue physical therapy  •	Weightbearing as tolerated of the left lower extremity with assistance of a walker  •	Incentive spirometry encouraged  •	Pain control as clinically indicated  •	Hospitalist note appreciated  •	Continue augmentin for dental abscess treatment  •	Discharge planning – anticipated discharge is Home when cleared by Medicine and PT
CANDACE SHINE  [id number]    History: 70y Male is status post left total knee arthroplasty on POD #2. Patient is doing well and is comfortable. The patient's pain is controlled using the prescribed pain medications. The patient is participating in physical therapy. Denies nausea, vomiting, chest pain, shortness of breath, abdominal pain or fever. No new complaints.                          10.8   8.05  )-----------( 158      ( 03 Sep 2020 05:57 )             34.0     09-03    141  |  104  |  22.0<H>  ----------------------------<  109<H>  4.5   |  28.0  |  0.91    Ca    8.5<L>      03 Sep 2020 05:57        MEDICATIONS  (STANDING):  acetaminophen   Tablet .. 975 milliGRAM(s) Oral every 8 hours  amoxicillin  875 milliGRAM(s)/clavulanate 1 Tablet(s) Oral two times a day  celecoxib 200 milliGRAM(s) Oral two times a day  hydrochlorothiazide 25 milliGRAM(s) Oral daily  influenza   Vaccine 0.5 milliLiter(s) IntraMuscular once  lactated ringers. 1000 milliLiter(s) (75 mL/Hr) IV Continuous <Continuous>  pantoprazole    Tablet 40 milliGRAM(s) Oral before breakfast  polyethylene glycol 3350 17 Gram(s) Oral daily  rivaroxaban 10 milliGRAM(s) Oral daily  sodium chloride 0.9% Bolus 500 milliLiter(s) IV Bolus once  sodium chloride 0.9% lock flush 3 milliLiter(s) IV Push every 8 hours  tamsulosin 0.4 milliGRAM(s) Oral at bedtime    MEDICATIONS  (PRN):  aluminum hydroxide/magnesium hydroxide/simethicone Suspension 30 milliLiter(s) Oral four times a day PRN Indigestion  bisacodyl Suppository 10 milliGRAM(s) Rectal daily PRN If no bowel movement by postoperative day #2  HYDROmorphone   Tablet 4 milliGRAM(s) Oral every 3 hours PRN Severe Pain (7 - 10)  HYDROmorphone  Injectable 0.5 milliGRAM(s) IV Push every 3 hours PRN breakthru  magnesium hydroxide Suspension 30 milliLiter(s) Oral daily PRN Constipation  ondansetron Injectable 4 milliGRAM(s) IV Push every 6 hours PRN Nausea and/or Vomiting  oxyCODONE    IR 5 milliGRAM(s) Oral every 3 hours PRN Mild Pain (1 - 3)  oxyCODONE    IR 10 milliGRAM(s) Oral every 3 hours PRN Moderate Pain (4 - 6)  senna 2 Tablet(s) Oral at bedtime PRN Constipation      Physical exam: ACE c/d/i - removed. The left knee dressing is clean, dry and intact - left tibia pin site dsg c/d/i. No drainage or discharge. No erythema is noted. No blistering. No ecchymosis. The calf is supple nontender. Passive range of motion is acceptable to due postoperative pain. Sensation to light touch is grossly intact distally. Motor function distally is 5/5. Extensor hallucis longus and flexor hallucis longus are intact. No foot drop. 2+ dorsalis pedis pulse. Capillary refill is less than 2 seconds. No cyanosis.    Primary Orthopedic Assessment:  •	s/p LEFT total knee replacement    Secondary  Orthopedic Assessment(s):   •	    Secondary  Medical Assessment(s):   •	    Plan:   •	DVT prophylaxis as prescribed, including use of compression devices and ankle pumps  •	Continue physical therapy  •	Weightbearing as tolerated of the left lower extremity with assistance of a walker  •	Incentive spirometry encouraged  •	Pain control as clinically indicated  •	Discharge planning – anticipated discharge is Home
Orthopedic PA Postop Note  Patient S/P LEFT TKA  Patient in bed comfortable   LEFT Leg  Dressing C/D/I - ACE wrap without staining  DP Pulse intact   Calf Soft NT  Dorsi/Plantar Flexion/EHL/FHL intact   Sensation intact to light touch    Vital Signs Last 24 Hrs  T(C): 36.4 (01 Sep 2020 19:50), Max: 36.7 (01 Sep 2020 18:35)  T(F): 97.5 (01 Sep 2020 19:50), Max: 98 (01 Sep 2020 18:35)  HR: 64 (01 Sep 2020 19:50) (47 - 80)  BP: 159/73 (01 Sep 2020 19:50) (103/60 - 159/73)  BP(mean): --  RR: 18 (01 Sep 2020 19:50) (11 - 20)  SpO2: 97% (01 Sep 2020 19:50) (97% - 100%)    < from: Xray Knee 1 or 2 Views, Left (09.01.20 @ 16:13) >     EXAM:  KNEE-LEFT                          PROCEDURE DATE:  09/01/2020          INTERPRETATION:  HISTORY: Postoperative  knee replacement.    Two views of the LEFT knee are submitted.    Evaluation demonstrates the presence of a tricompartmental knee replacement with the femoral, tibial and patellar components in proper anatomic alignment. There is no fracture .  Impression:  Knee prosthetic components in proper anatomical alignment.                GOKUL KEVIN M.D., ATTENDING RADIOLOGIST  This document has been electronically signed. Sep  1 2020  4:20PM    < end of copied text >      A/P:  S/P LEFT TKA  1. DVTP - XARELTO  2. Physical Therapy   3. Pain Control as clinically indicated
Received call from medical team, patient with hx of DVT on 12/2017. Xarelto ordered for post -op DVTP. Per medicine patient currently being treated for dental abscess started on a 10 day course of Augmentin on 9/28/2020.  Augmentin ordered to complete 10 day course. Dr. Abreu made aware.
CANDACE SHINE    [id number]    70y      Male    CC: Lt Knee pain s/p Lt TKR POD#2  Doing well, pain is fairly controlled, ambulated with PT, tolerating po, urinating without any issues. denies any light headedness/dizziness on ambulation      INTERVAL HPI/OVERNIGHT EVENTS: no acute events     REVIEW OF SYSTEMS:    CONSTITUTIONAL: No fever or fatigue  RESPIRATORY: No cough, wheezing, No shortness of breath  CARDIOVASCULAR: No chest pain, palpitations  GASTROINTESTINAL: No abdominal or epigastric pain. No nausea, vomiting      Vital Signs Last 24 Hrs  T(C): 36.7 (03 Sep 2020 04:32), Max: 36.9 (02 Sep 2020 19:57)  T(F): 98 (03 Sep 2020 04:32), Max: 98.4 (02 Sep 2020 19:57)  HR: 62 (03 Sep 2020 04:32) (62 - 72)  BP: 129/56 (03 Sep 2020 04:32) (119/68 - 140/59)  BP(mean): --  RR: 18 (03 Sep 2020 04:32) (18 - 18)  SpO2: 96% (03 Sep 2020 04:32) (96% - 98%)      PHYSICAL EXAM:    GENERAL: NAD, well-groomed  HEENT: PERRL, +EOMI  NECK: soft, Supple  CHEST/LUNG: Clear to percussion bilaterally; No wheezing  HEART: S1S2+, Regular rate and rhythm; No murmurs  EXTREMITIES:   No clubbing, cyanosis, or edema  SKIN: No rashes or lesions  NEURO: AAOX3              LABS:                                   10.8   8.05  )-----------( 158      ( 03 Sep 2020 05:57 )             34.0   09-03    141  |  104  |  22.0<H>  ----------------------------<  109<H>  4.5   |  28.0  |  0.91    Ca    8.5<L>      03 Sep 2020 05:57              MEDICATIONS  (STANDING):  acetaminophen   Tablet .. 975 milliGRAM(s) Oral every 8 hours  amoxicillin  875 milliGRAM(s)/clavulanate 1 Tablet(s) Oral two times a day  celecoxib 200 milliGRAM(s) Oral two times a day  hydrochlorothiazide 25 milliGRAM(s) Oral daily  influenza   Vaccine 0.5 milliLiter(s) IntraMuscular once  pantoprazole    Tablet 40 milliGRAM(s) Oral before breakfast  polyethylene glycol 3350 17 Gram(s) Oral daily  rivaroxaban 10 milliGRAM(s) Oral daily  sodium chloride 0.9% Bolus 500 milliLiter(s) IV Bolus once  sodium chloride 0.9% lock flush 3 milliLiter(s) IV Push every 8 hours  tamsulosin 0.4 milliGRAM(s) Oral at bedtime    MEDICATIONS  (PRN):  aluminum hydroxide/magnesium hydroxide/simethicone Suspension 30 milliLiter(s) Oral four times a day PRN Indigestion  bisacodyl Suppository 10 milliGRAM(s) Rectal daily PRN If no bowel movement by postoperative day #2  HYDROmorphone   Tablet 4 milliGRAM(s) Oral every 3 hours PRN Severe Pain (7 - 10)  HYDROmorphone  Injectable 0.5 milliGRAM(s) IV Push every 3 hours PRN breakthru  magnesium hydroxide Suspension 30 milliLiter(s) Oral daily PRN Constipation  ondansetron Injectable 4 milliGRAM(s) IV Push every 6 hours PRN Nausea and/or Vomiting  oxyCODONE    IR 5 milliGRAM(s) Oral every 3 hours PRN Mild Pain (1 - 3)  oxyCODONE    IR 10 milliGRAM(s) Oral every 3 hours PRN Moderate Pain (4 - 6)  senna 2 Tablet(s) Oral at bedtime PRN Constipation        RADIOLOGY & ADDITIONAL TESTS:

## 2020-09-14 DIAGNOSIS — Z96.651 PRESENCE OF RIGHT ARTIFICIAL KNEE JOINT: ICD-10-CM

## 2020-09-14 RX ORDER — OXYCODONE 10 MG/1
10 TABLET ORAL
Qty: 40 | Refills: 0 | Status: ACTIVE | COMMUNITY
Start: 2020-09-14 | End: 1900-01-01

## 2020-09-23 ENCOUNTER — APPOINTMENT (OUTPATIENT)
Dept: ORTHOPEDIC SURGERY | Facility: CLINIC | Age: 70
End: 2020-09-23
Payer: MEDICARE

## 2020-09-23 VITALS — TEMPERATURE: 96.8 F

## 2020-09-23 VITALS
HEART RATE: 105 BPM | TEMPERATURE: 98.2 F | HEIGHT: 64 IN | WEIGHT: 160 LBS | OXYGEN SATURATION: 97 % | SYSTOLIC BLOOD PRESSURE: 101 MMHG | RESPIRATION RATE: 15 BRPM | DIASTOLIC BLOOD PRESSURE: 68 MMHG | BODY MASS INDEX: 27.31 KG/M2

## 2020-09-23 PROCEDURE — 73562 X-RAY EXAM OF KNEE 3: CPT | Mod: LT

## 2020-09-23 PROCEDURE — 99024 POSTOP FOLLOW-UP VISIT: CPT

## 2020-09-23 NOTE — HISTORY OF PRESENT ILLNESS
[___ Weeks Post Op] : [unfilled] weeks post op [Neuro Intact] : an unremarkable neurological exam [Vascular Intact] : ~T peripheral vascular exam normal [Negative Jimi's] : maneuvers demonstrated a negative Jimi's sign [Xray (Date:___)] : [unfilled] Xray -  [No Sign of Infection] : is showing no signs of infection [Procedure: ___] : status post [unfilled] [Bony Fusion] : bony fusion [Callus Formation] : callus formation [Hardware in Good Position] : hardware in good position [No Obvious Fractures] : no obvious fractures [Good Overall Alignment] : good overall alignment [Doing Well] : is doing well [6] : the patient reports pain that is 6/10 in severity [Fair Pain Control] : has fair pain control [Chills] : no chills [Constipation] : no constipation [Diarrhea] : no diarrhea [Dysuria] : no dysuria [Fever] : no fever [Nausea] : no nausea [Vomiting] : no vomiting [Healed] : not healed [Erythema] : not erythematous [Discharge] : absent of discharge [Swelling] : not swollen [Dehiscence] : not dehisced [de-identified] : S/P Roboticassisted left total knee arthroplasty, [facility] total [facility], DOS: 9/1/20.\par  [de-identified] : Pt is 3 weeks post-op. Pt has finished the home PT. Pt is ambulating with walker. Pt is taking Xarelto 10 mg daily for DVTP, was given #28 tabs\par his pain was poorly controlled with oxycodone 5 mg so we increased his dosage to 10 mg.\par His pain is better with 10 mg. He denies previous blood clot and stomach ulcers. He also denies having acid reflux. [de-identified] : Left knee exam shows healed incision with no sign of infection. Range of motion 0 to 85 degrees [de-identified] : 3 view X-ray of left knee demonstrate implants are in good alignment. No evidence of subsidence or loosening or wear  [de-identified] : Pt will continue ROM and ambulation. He will stop xarelto once the rx is completed and take 81mg ASA bid for 3 weeks.  I provided out pt PT rx. He should take baby aspirin twice a day for the next four weeks. Pt understands the importance of prophylaxis for invasive dental procedures. F/u with us in 3 weeks.

## 2020-09-23 NOTE — END OF VISIT
[FreeTextEntry3] : I, Javier Luke, acted solely as a scribe for Dr. Jose Abreu on this date 09/23/2020.

## 2020-10-13 RX ORDER — MELOXICAM 15 MG/1
15 TABLET ORAL DAILY
Qty: 30 | Refills: 0 | Status: ACTIVE | COMMUNITY
Start: 2020-10-13 | End: 1900-01-01

## 2020-10-13 RX ORDER — AMOXICILLIN 500 MG/1
CAPSULE ORAL
Refills: 0 | Status: DISCONTINUED | COMMUNITY
End: 2020-10-13

## 2020-10-13 RX ORDER — MELOXICAM 15 MG/1
15 TABLET ORAL
Qty: 60 | Refills: 0 | Status: DISCONTINUED | COMMUNITY
Start: 2019-01-07 | End: 2020-10-13

## 2020-10-20 ENCOUNTER — APPOINTMENT (OUTPATIENT)
Dept: ORTHOPEDIC SURGERY | Facility: CLINIC | Age: 70
End: 2020-10-20
Payer: MEDICARE

## 2020-10-20 VITALS
SYSTOLIC BLOOD PRESSURE: 112 MMHG | HEART RATE: 84 BPM | WEIGHT: 160 LBS | HEIGHT: 64 IN | BODY MASS INDEX: 27.31 KG/M2 | DIASTOLIC BLOOD PRESSURE: 74 MMHG

## 2020-10-20 PROCEDURE — 73562 X-RAY EXAM OF KNEE 3: CPT | Mod: 26,LT

## 2020-10-20 PROCEDURE — 99024 POSTOP FOLLOW-UP VISIT: CPT

## 2020-10-20 PROCEDURE — 99072 ADDL SUPL MATRL&STAF TM PHE: CPT

## 2020-10-20 RX ORDER — LACTULOSE 10 G/15ML
10 SOLUTION ORAL DAILY
Qty: 200 | Refills: 1 | Status: ACTIVE | COMMUNITY
Start: 2020-10-20 | End: 1900-01-01

## 2020-10-20 RX ORDER — OXYCODONE 10 MG/1
10 TABLET ORAL
Qty: 30 | Refills: 0 | Status: ACTIVE | COMMUNITY
Start: 2020-10-20 | End: 1900-01-01

## 2020-10-20 RX ORDER — SENNOSIDES 8.6 MG TABLETS 8.6 MG/1
8.6 TABLET ORAL
Qty: 60 | Refills: 0 | Status: ACTIVE | COMMUNITY
Start: 2020-10-20 | End: 1900-01-01

## 2020-10-20 NOTE — HISTORY OF PRESENT ILLNESS
[5] : the patient reports pain that is 5/10 in severity [Xray (Date:___)] : [unfilled] Xray -  [Doing Well] : is doing well [Fair Pain Control] : has fair pain control [No Sign of Infection] : is showing no signs of infection [Chills] : no chills [Constipation] : no constipation [Dysuria] : no dysuria [Diarrhea] : no diarrhea [Fever] : no fever [Nausea] : no nausea [Vomiting] : no vomiting [de-identified] : S/P Robotic_assisted left total knee arthroplasty, [facility] total knee, DOS: 9/1/20. [de-identified] : he continues to have medial knee pain.\par he is taking oxycodone 10mg, tylenol and mobic\par he has hard time getting up from the low bed.\par he is walking with cane\par he completed xarelto. \par he c/o no BM for 3 days\par  [de-identified] : Left knee exam shows healed incision with no sign of infection. Range of motion 0 to 100 degrees. The surgical incision site(s) not healed, but not erythematous, absent of discharge, not swollen and not dehisced. Additional findings included an unremarkable neurological exam, peripheral vascular exam normal and maneuvers demonstrated a negative Jimi's sign.  [de-identified] : 3 view X-ray of left knee demonstrate implants are in good alignment. No evidence of subsidence or loosening or wear. \par \par  [de-identified] : Pt will continue ROM and ambulation.  I provided extension of out pt PT rx. . Pt understands the importance of prophylaxis for invasive dental procedures. F/u with us in 3 weeks. \par

## 2020-11-25 ENCOUNTER — APPOINTMENT (OUTPATIENT)
Dept: ORTHOPEDIC SURGERY | Facility: CLINIC | Age: 70
End: 2020-11-25
Payer: MEDICARE

## 2020-11-25 VITALS
DIASTOLIC BLOOD PRESSURE: 87 MMHG | SYSTOLIC BLOOD PRESSURE: 137 MMHG | WEIGHT: 160 LBS | HEIGHT: 64 IN | BODY MASS INDEX: 27.31 KG/M2 | HEART RATE: 76 BPM

## 2020-11-25 PROCEDURE — 99024 POSTOP FOLLOW-UP VISIT: CPT

## 2020-11-25 PROCEDURE — 73562 X-RAY EXAM OF KNEE 3: CPT | Mod: LT

## 2020-11-25 NOTE — END OF VISIT
[FreeTextEntry3] : I, Javier Luke, acted solely as a scribe for Dr. Jose Abreu on this date 11/25/2020.

## 2020-11-25 NOTE — HISTORY OF PRESENT ILLNESS
[Healed] : healed [Neuro Intact] : an unremarkable neurological exam [Vascular Intact] : ~T peripheral vascular exam normal [Negative Jimi's] : maneuvers demonstrated a negative Jimi's sign [Slow Progress] : is progressing slowly [Fair Pain Control] : has fair pain control [No Sign of Infection] : is showing no signs of infection [Procedure: ___] : status post [unfilled] [4] : the patient reports pain that is 4/10 in severity [Clean/Dry/Intact] : clean, dry and intact [Xray (Date:___)] : [unfilled] Xray -  [Chills] : no chills [Constipation] : no constipation [Diarrhea] : no diarrhea [Dysuria] : no dysuria [Fever] : no fever [Nausea] : no nausea [Vomiting] : no vomiting [Erythema] : not erythematous [Discharge] : absent of discharge [Swelling] : not swollen [Dehiscence] : not dehisced [de-identified] : S/P Robotic_assisted left total knee arthroplasty, [facility] total knee, DOS: 9/1/20. \par \par  [de-identified] : Pt is 3 months post-op. He c/o medial knee pain. He c/o pain at night and pain with long walking. Additionally, he has pain with movement after being stationary for long periods of time. He had 14 session of outpatient PT and is doing home exercise. He is walking with cane.  [de-identified] : Left knee exam shows healed incision with no sign of infection. Range of motion 0 to 115 degrees [de-identified] :  3 view X-ray of left knee demonstrate implants are in good alignment. No evidence of subsidence or loosening or wear. \par \par  [de-identified] : We prescribed more PT today as well as Celebrex. He should continue to do low impact exercises. F/u in three months.

## 2020-11-30 ENCOUNTER — FORM ENCOUNTER (OUTPATIENT)
Age: 70
End: 2020-11-30

## 2021-01-15 ENCOUNTER — APPOINTMENT (OUTPATIENT)
Dept: ORTHOPEDIC SURGERY | Facility: CLINIC | Age: 71
End: 2021-01-15
Payer: MEDICARE

## 2021-01-15 VITALS
HEART RATE: 88 BPM | SYSTOLIC BLOOD PRESSURE: 161 MMHG | HEIGHT: 64 IN | DIASTOLIC BLOOD PRESSURE: 94 MMHG | WEIGHT: 160 LBS | BODY MASS INDEX: 27.31 KG/M2

## 2021-01-15 PROCEDURE — 99072 ADDL SUPL MATRL&STAF TM PHE: CPT

## 2021-01-15 PROCEDURE — 99213 OFFICE O/P EST LOW 20 MIN: CPT

## 2021-01-15 PROCEDURE — 73562 X-RAY EXAM OF KNEE 3: CPT | Mod: 26,LT

## 2021-01-15 RX ORDER — METHYLPREDNISOLONE 4 MG/1
4 TABLET ORAL
Qty: 1 | Refills: 0 | Status: ACTIVE | COMMUNITY
Start: 2021-01-15 | End: 1900-01-01

## 2021-01-15 RX ORDER — LORATADINE 10 MG/1
10 TABLET ORAL
Qty: 30 | Refills: 0 | Status: ACTIVE | COMMUNITY
Start: 2021-01-15 | End: 1900-01-01

## 2021-01-15 NOTE — PHYSICAL EXAM
[Normal] : Gait: normal [Cane] : ambulates with cane [LE] : Sensory: Intact in bilateral lower extremities [ALL] : Biceps, brachioradialis, triceps, patellar, ankle and plantar 2+ and symmetric bilaterally [Antalgic] : not antalgic [de-identified] : GENERAL APPEARANCE: Well nourished and hydrated, pleasant, alert, and oriented x 3. Appears their stated age. \par HEENT: Normocephalic, extraocular eye motion intact. Nasal septum midline. Oral cavity clear. External auditory canal clear. \par RESPIRATORY: Breath sounds clear and audible in all lobes. No wheezing, No accessory muscle use.\par CARDIOVASCULAR: No apparent abnormalities. No lower leg edema. No varicosities. Pedal pulses are palpable.\par NEUROLOGIC: Sensation is normal, no muscle weakness in the upper or lower extremities.\par DERMATOLOGIC: No apparent skin lesions, moist, warm, no rash.\par SPINE: Cervical spine appears normal and moves freely; thoracic spine appears normal and moves freely; lumbosacral spine appears normal and moves freely, normal, nontender.\par MUSCULOSKELETAL: Hands, wrists, and elbows are normal and move freely, shoulders are normal and move freely.  [de-identified] : Left knee exam shows healed incision with no sign of infection.\par lateral knee thickened  scaling skin. \par  Range of motion 0 to 120 degrees. \par \par The surgical incision site(s) clean, dry and intact, healed, not erythematous, absent of discharge, not swollen and not dehisced. Additional findings included an unremarkable neurological exam, peripheral vascular exam normal and maneuvers demonstrated a negative Jimi's sign. \par  [de-identified] : 3 view X-ray of left knee demonstrate implants are in good alignment. No evidence of subsidence or loosening or wear. \par \par

## 2021-01-15 NOTE — REASON FOR VISIT
[Follow-Up Visit] : a follow-up visit for [Other: ____] : [unfilled] [[employer] ] : provided by [employer]   [FreeTextEntry2] : S/P Robotic_assisted left total knee arthroplasty, [facility] total knee, DOS: 9/1/20. \par  [FreeTextEntry1] : [id number] [TWNoteComboBox1] : [place]

## 2021-01-15 NOTE — DISCUSSION/SUMMARY
[de-identified] : 69 y/o male s.p  Robotic_assisted left [facility]: [facility] \par mild to moderate medial knee pain. no sign of infection, the mobility and knee function are okay. \par xrays are reassuring. lateral knee skin change, itchenss and numbness\par numbness is expected and the numb area should decrease over time.\par + atopic dermatitis?\par I started medrol dose tammy, loratadin and steroid cream \par f/u in 1M for re evauation\par \par \par \par

## 2021-01-15 NOTE — HISTORY OF PRESENT ILLNESS
[Pain Location] : pain [Stable] : stable [___ mths] : [unfilled] month(s) ago [3] : a current pain level of 3/10 [Standing] : standing [Intermit.] : ~He/She~ states the symptoms seem to be intermittent [Walking] : worsened by walking [NSAIDs] : relieved by nonsteroidal anti-inflammatory drugs [Physical Therapy] : relieved by physical therapy [Rest] : relieved by rest [de-identified] : pt presents back to office for persistent medial knee pain of TKA \par he is 4M from the sx\par he is in PT. walks without using a cane. \par he takes celebrex\par he is concerned with lateral knee richness and dry skin\par he is also concerned with lateral knee numbness

## 2021-02-26 ENCOUNTER — APPOINTMENT (OUTPATIENT)
Dept: ORTHOPEDIC SURGERY | Facility: CLINIC | Age: 71
End: 2021-02-26
Payer: MEDICARE

## 2021-02-26 VITALS
HEIGHT: 64 IN | DIASTOLIC BLOOD PRESSURE: 90 MMHG | WEIGHT: 160 LBS | SYSTOLIC BLOOD PRESSURE: 167 MMHG | HEART RATE: 76 BPM | BODY MASS INDEX: 27.31 KG/M2

## 2021-02-26 DIAGNOSIS — Z96.652 PRESENCE OF LEFT ARTIFICIAL KNEE JOINT: ICD-10-CM

## 2021-02-26 DIAGNOSIS — Z96.652 AFTERCARE FOLLOWING JOINT REPLACEMENT SURGERY: ICD-10-CM

## 2021-02-26 DIAGNOSIS — Z47.1 AFTERCARE FOLLOWING JOINT REPLACEMENT SURGERY: ICD-10-CM

## 2021-02-26 PROCEDURE — 99072 ADDL SUPL MATRL&STAF TM PHE: CPT

## 2021-02-26 PROCEDURE — 99213 OFFICE O/P EST LOW 20 MIN: CPT

## 2021-02-26 RX ORDER — CELECOXIB 200 MG/1
200 CAPSULE ORAL TWICE DAILY
Qty: 60 | Refills: 1 | Status: DISCONTINUED | COMMUNITY
Start: 2020-11-25 | End: 2021-02-26

## 2021-02-26 RX ORDER — BETAMETHASONE DIPROPIONATE 0.5 MG/G
0.05 CREAM TOPICAL DAILY
Qty: 1 | Refills: 1 | Status: ACTIVE | COMMUNITY
Start: 2021-01-15 | End: 1900-01-01

## 2021-02-26 RX ORDER — LORATADINE 10 MG/1
10 TABLET ORAL
Qty: 30 | Refills: 0 | Status: ACTIVE | COMMUNITY
Start: 2021-02-26 | End: 1900-01-01

## 2021-02-26 NOTE — HISTORY OF PRESENT ILLNESS
[Pain Location] : pain [Stable] : stable [2] : a current pain level of 2/10 [de-identified] : pt presents back to office for persistent medial knee pain of left TKA\par he is 5M from the sx\par he states that he does not have the strengthen to go up stairs.\par he no longer ambulates with a cane\par he notes having a little pain at night\par he is done PT 2 weeks ago. \par he takes celebrex. \par he is concerned with lateral knee itchiness and dry skin

## 2021-02-26 NOTE — DISCUSSION/SUMMARY
[de-identified] : 71 y/o M s/p left TKA 9/1/2020. The patient is still experiencing some pain and weakness in the left knee. He understands the importance of doing low impact exercises to strengthen his muscles and to try to alleviate his symptoms. He will d/c the Celebrex. We provided another prescription for betamethasone. Pt understands the importance of prophylaxis for invasive dental procedures. F/u with us a year from surgery. \par \par \par The patient is a 70 year individual with end stage arthritis of their left knee joint. Based upon the patient's continued symptoms and failure to respond to conservative treatment, pt successfully completed left total knee arthroplasty. A long discussion took place with the patient describing what a total joint replacement is and what the procedure would entail. A total knee arthroplasty model, similar to the implant that was used during the operation, was utilized to demonstrate and to discuss the various bearing surfaces of the implants. The hospitalization and post-operative care and rehabilitation were also discussed. The use of perioperative antibiotics and DVT prophylaxis were discussed. The risk, benefits and alternatives to a surgical intervention were discussed at length with the patient. The patient was also advised of risks related to the medical comorbidities, elevated body mass index (BMI), and smoking where applicable. We discussed how to reduce modifiable risk factors and encouraged smoking cessation were applicable.. A lengthy discussion took place to review the most common complications including but not limited to: deep vein thrombosis, pulmonary embolus, heart attack, stroke, infection, wound breakdown, numbness, damage to nerves, tendon, muscles, arteries or other blood vessels, death and other possible complications from anesthesia. The patient was told that we will take steps to minimize these risks by using sterile technique, antibiotics and DVT prophylaxis when appropriate and follow the patient postoperatively in the office setting to monitor progress. The possibility of recurrent pain, no improvement in pain and actual worsening of pain were also discussed with the patient.\par The discharge plan of care focused on the patient going home following surgery. The patient was encouraged to make the necessary arrangements to have someone stay with them when they are discharged home. Following discharge, a home care nurse was to the patient. The home care nurse would open the patient’s home care case and request home physical therapy services. Home physical therapy was to commence following discharge provided it was appropriate and covered by the health insurance benefit plan. \par The benefits of surgery were discussed with the patient including the potential for improving his current clinical condition through operative intervention. Alternatives to surgical intervention including continued conservative management were also discussed in detail. All questions were answered to the satisfaction of the patient. The treatment plan of care, as well as a model of a total knee arthroplasty equivalent to the one that will be used for their total joint replacement, was shared with the patient. The patient agreed to the plan of care as well as the use of implants in their total joint replacement.

## 2021-02-26 NOTE — PHYSICAL EXAM
[Normal] : Gait: normal [Cane] : ambulates with cane [LE] : Sensory: Intact in bilateral lower extremities [ALL] : dorsalis pedis, posterior tibial, femoral, popliteal, and radial 2+ and symmetric bilaterally [Antalgic] : not antalgic [de-identified] : GENERAL APPEARANCE: Well nourished and hydrated, pleasant, alert, and oriented x 3. Appears their stated age. \par HEENT: Normocephalic, extraocular eye motion intact. Nasal septum midline. Oral cavity clear. External auditory canal clear. \par RESPIRATORY: Breath sounds clear and audible in all lobes. No wheezing, No accessory muscle use.\par CARDIOVASCULAR: No apparent abnormalities. No lower leg edema. No varicosities. Pedal pulses are palpable.\par NEUROLOGIC: Sensation is normal, no muscle weakness in the upper or lower extremities.\par DERMATOLOGIC: No apparent skin lesions, moist, warm, no rash.\par SPINE: Cervical spine appears normal and moves freely; thoracic spine appears normal and moves freely; lumbosacral spine appears normal and moves freely, normal, nontender.\par MUSCULOSKELETAL: Hands, wrists, and elbows are normal and move freely, shoulders are normal and move freely.  [de-identified] : Left knee exam shows healed incision with no sign of infection.\par lateral knee thickened  scaling skin. \par  Range of motion 0 to 120 degrees. \par \par The surgical incision site(s) clean, dry and intact, healed, not erythematous, absent of discharge, not swollen and not dehisced. Additional findings included an unremarkable neurological exam, peripheral vascular exam normal and maneuvers demonstrated a negative Jimi's sign. \par

## 2021-02-26 NOTE — REASON FOR VISIT
[Follow-Up Visit] : a follow-up visit for [Other: ____] : [unfilled] [[employer] ] : provided by [employer]   [FreeTextEntry2] :  S/P Robotic_assisted left total knee arthroplasty, David Vinayakannmarie total knee, DOS: 9/1/20. \par  [FreeTextEntry1] : [id number] [TWNoteComboBox1] : [place]onese

## 2021-02-26 NOTE — END OF VISIT
[FreeTextEntry3] : I, Javier Luke, acted solely as a scribe for Dr. Jose Abreu on this date 02/26/2021.

## 2021-03-26 ENCOUNTER — APPOINTMENT (OUTPATIENT)
Dept: ORTHOPEDIC SURGERY | Facility: CLINIC | Age: 71
End: 2021-03-26

## 2021-10-18 NOTE — ASU PATIENT PROFILE, ADULT - BRADEN SCORE (IF 18 OR LESS ACTIVATE SKIN INJURY RISK INCREASED GUIDELINE), MLM
October 18, 2021      Tramaine Ramirez  [address]  [facility] [address]        Dear Mr. Ramirez,    The results of your endoscopy performed on 10/13/2021 have returned and indicate the following:    Your procedure showed portal hypertensive gastropathy. This is a side effect of cirrhosis and portal hypertension, so you should continue your cirrhosis medications. Recommend a repeat EGD in 2 years due to esophageal varices.     It has been a pleasure caring for you. If you have questions or concerns regarding these test results or your plan of care, please feel free to contact us. You may either contact us by phone, MyChart or schedule a follow-up office visit to go over these results.      Sincerely,  Binu Pugh MD  [facility] GI Department  [address] [address] [phone]     22

## 2021-11-12 ENCOUNTER — APPOINTMENT (OUTPATIENT)
Dept: ORTHOPEDIC SURGERY | Facility: CLINIC | Age: 71
End: 2021-11-12

## 2022-09-06 NOTE — ASU PREOP CHECKLIST - TEMPERATURE IN FAHRENHEIT (DEGREES F)
97.4 Suturegard Intro: Intraoperative tissue expansion was performed, utilizing the SUTUREGARD device, in order to reduce wound tension.

## 2023-06-07 NOTE — PHYSICAL THERAPY INITIAL EVALUATION ADULT - RANGE OF MOTION EXAMINATION, REHAB EVAL
[facility] Hospitalist History and Physical      Chief Complaint  Chest pain radiating to back during undergoing cardio diagnostic testing.    History Of Present Illness  Porsche Harris  is a 87 year old female   Polish speaking got the history from the daughter Mary at the bedside with Hx.  CAD s/p PCI, ischemic cardiomyopathy with EF of 30 to 35%, HTN, HL, PVD, hypothyroidism, Pitts's esophagus came for  cardiodiagnostics for echo and stress test as outpatient had a chest pain during the cardio diagnostic.  Patient came electively for stress test and echocardiogram to cardiology department and overall doing Cardiolite or Definity contrast patient developed severe chest pain 10 out of 10 per daughter and radiated to the right lower back without any nausea, vomiting, diaphoresis, dyspnea.  Per daughter patient ambulates at home without any chest pain or dyspnea.  Supposed to use devices but does not use for walking.  Called RRT in the cardiology department and transferred to patient to the ER for further evaluation.  EKG showed atrial paced rhythm, troponin is 29.  Pain resolved on its own per patient and denies any more chest pain, back pain.  Otherwise denies any abdominal pain, epigastric pain, dysuria, flank pain, hematuria.  Labs CBC normal, calcium is 10.4, creatinine is 1.14.  AST ALT normal D-dimer was 0.80.  CTA chest no acute process within the chest.  Cardiomegaly.  Discussed with Dr. Beavers.  Discussed with the daughter Mary and updated the condition.      Past Medical History  Past Medical History:   Diagnosis Date   • CHF (congestive heart failure) (CMD)    • Essential (primary) hypertension    • Myocardial infarction (CMD)    • PAD (peripheral artery disease) (CMD)    • PVD (peripheral vascular disease) (CMD)    • Thyroid disease         Surgical History  Past Surgical History:   Procedure Laterality Date   • Cholecystectomy     • Coronary angioplasty with stent placement     • Hysterectomy     •  Pacemaker          Social History  Social History     Tobacco Use   • Smoking status: Never   • Smokeless tobacco: Never   Vaping Use   • Vaping Use: never used   Substance Use Topics   • Alcohol use: Not Currently   • Drug use: Never       Family History  Family History   Problem Relation Age of Onset   • Heart disease Other         Allergies  ALLERGIES:   Allergen Reactions   • Bamlanivimab Other (See Comments)     TB test, Chest pressure     • Pneumococcal Polysaccharides RASH        Medications  No current facility-administered medications for this encounter.     Current Outpatient Medications   Medication Sig Dispense Refill   • atorvastatin (LIPITOR) 80 MG tablet TAKE 1 TABLET BY MOUTH EVERY NIGHT 90 tablet 1   • levothyroxine 100 MCG tablet TAKE 1 TABLET BY MOUTH DAILY 90 tablet 1   • losartan (COZAAR) 100 MG tablet TAKE 1 TABLET(100 MG) BY MOUTH DAILY 90 tablet 1   • clopidogrel (PLAVIX) 75 MG tablet Take 75 mg by mouth.     • furosemide (LASIX) 40 MG tablet Take 1 tablet by mouth daily. Do not start before September 21, 2021. 30 tablet 0   • spironolactone (ALDACTONE) 25 MG tablet Take 1 tablet by mouth daily. Do not start before September 21, 2021. 30 tablet 0   • metoPROLOL succinate (TOPROL-XL) 100 MG 24 hr tablet Take 50 mg by mouth daily.      • diclofenac (VOLTAREN) 1 % gel Place 2 g onto the skin daily as needed.      • Cholecalciferol (vitamin D) 50 mcg (2,000 units) capsule Take 2,000 Units by mouth.     • aspirin 81 MG EC tablet Take 81 mg by mouth daily.        Prior to Admission medications    Medication Sig Start Date End Date Taking? Authorizing Provider   atorvastatin (LIPITOR) 80 MG tablet TAKE 1 TABLET BY MOUTH EVERY NIGHT 5/30/23   Hulesch, William S, MD   levothyroxine 100 MCG tablet TAKE 1 TABLET BY MOUTH DAILY 5/17/23   Beata Murray MD   losartan (COZAAR) 100 MG tablet TAKE 1 TABLET(100 MG) BY MOUTH DAILY 2/8/23   Beata Murray MD   clopidogrel (PLAVIX) 75 MG tablet Take 75 mg by  mouth. 10/14/21 10/14/22  Provider, Outside   furosemide (LASIX) 40 MG tablet Take 1 tablet by mouth daily. Do not start before September 21, 2021. 9/21/21   Eric Rooney PA-C   spironolactone (ALDACTONE) 25 MG tablet Take 1 tablet by mouth daily. Do not start before September 21, 2021. 9/21/21   Eric Rooney PA-C   metoPROLOL succinate (TOPROL-XL) 100 MG 24 hr tablet Take 50 mg by mouth daily.  11/2/20   Provider, Outside   diclofenac (VOLTAREN) 1 % gel Place 2 g onto the skin daily as needed.  4/30/14   Provider, Outside   Cholecalciferol (vitamin D) 50 mcg (2,000 units) capsule Take 2,000 Units by mouth. 11/11/19   Provider, Outside   aspirin 81 MG EC tablet Take 81 mg by mouth daily.     Provider, Outside        Review of Systems  Review of Systems   Constitutional: Negative.    HENT: Negative.    Eyes: Negative.    Respiratory: Negative.  Negative for shortness of breath.    Cardiovascular: Positive for chest pain. Negative for leg swelling.   Gastrointestinal: Negative.  Negative for abdominal pain.   Endocrine: Negative.    Genitourinary: Negative.    Musculoskeletal: Positive for back pain.   Skin: Negative.    Allergic/Immunologic: Negative.    Neurological: Negative.    Hematological: Negative.    Psychiatric/Behavioral: Negative.          Physical Exam  Visit Vitals  BP (!) 177/92   Pulse 82   Temp 97.9 °F (36.6 °C) (Oral)   Resp 18   Ht 5' (1.524 m)   Wt 62 kg (136 lb 11 oz)   SpO2 100%   BMI 26.69 kg/m²        General appearance -not in distress  Mental status -awake alert seems oriented x3   HEENT -atraumatic, normocephalic.  No icterus, mouth -mucous membrane moist and pink, no exudate.    Neck -supple, no lymphadenopathy  Chest -lungs are clear on examination no crackles no wheezing  Heart -S1-S2 heard no murmurs, no chest wall tenderness  Abdomen -soft,, no epigastric or no RUQ tenderness, no guarding no masses felt, no peritoneal signs  Back exam -no back tenderness  Neurological  no  focal deficit able to move all his extremities.    Musculoskeletal -no joint effusion  Extremities -no pedal edema no calf tenderness  Skin -no rash      Imaging  CTA CHEST   Final Result      1.  No acute process within the chest.     2.  Cardiomegaly.      Electronically Signed by: DOTTIE WORKMAN M.D.    Signed on: 2023 2:40 PM    Workstation ID: [id number]          EKG:  Encounter Date: 23   Electrocardiogram 12-Lead   Result Value    Ventricular Rate EKG/Min (BPM) 62    Atrial Rate (BPM) 60    QRS-Interval (MSEC) 174    QT-Interval (MSEC) 486    QTc 494    R Axis (Degrees) -27    T Axis (Degrees) 170    REPORT TEXT      Atrial-paced rhythm  with prolonged AV conduction  with occasional  premature ventricular complexes  Left bundle branch block  Abnormal ECG  When compared with ECG of  17-SEP-2021 07:16,  premature ventricular complexes  are now  present  Nonspecific T wave abnormality now evident in  Inferior leads  T wave inversion less evident in  Lateral leads         ECHO:  Recent Results (from the past 365 days)    Transthoracic Echo (TTE) Complete W/ W/O Imaging Agent    Impression  *[facility]*  [address] [place] [address] [phone]  Transthoracic Echocardiogram (TTE)    Patient: Porsche Harris     Study Date/Time:        2023 10:45AM  MRN:     [id number]             FIN#:                   [id number]  :     1936          Ht/Wt:                  149.9cm 64kg  Age:     87                  BSA/BMI:                1.65m^2 28.5kg/m^2  Gender:  F                   Baseline BP:            132 / 76  Ordering Physician:   Cuate Abebe MD    Referring Physician:  Cuate Abebe MD    Attending Physician:  Cuate Abebe MD  Performing Physician: Cuate Abebe MD  Diagnostic Physician: Cuate Abebe MD  Sonographer:          Simona Macias  RCS    ------------------------------------------------------------------------------  INDICATIONS:   Cardiomyopathy.    ------------------------------------------------------------------------------  STUDY CONCLUSIONS  SUMMARY:    1. Left ventricle: The cavity size is moderately dilated. Wall thickness is  moderately increased. Systolic function is severely reduced. The ejection  fraction was measured by biplane method of disks. There is moderate diffuse  hypokinesis. Cannot assess LV diastolic function. The ejection fraction is  30%.  2. Ventricular septum: Septal motion is dyssynergic.  3. Mitral valve: There is moderate regurgitation.  4. Left atrium: The atrium is moderately dilated.  5. Right ventricle: The cavity size is normal. Wall thickness is normal.  Systolic function is normal. Pacemaker lead noted in right ventricle.  6. Right atrium: Pacemaker lead noted in right atrium.  7. Tricuspid valve: There is mild regurgitation.  8. Pulmonary arteries: Systolic pressure is within the normal range, estimated  to be 25mm Hg.  IMPRESSIONS:   The study is unchanged since the study of 09/16/2021.    ------------------------------------------------------------------------------  STUDY DATA:  [place] Comparison is made to the study of 09/16/2021.  Procedure:  A transthoracic echocardiogram was performed. Image quality was  suboptimal. Intravenous contrast (Definity 2ml) was administered to opacify  the left ventricle.  M-mode, complete 2D, complete spectral Doppler, color  Doppler, and intravenous contrast injection.  Study status:  Routine.  Study  completion:  There were no complications.    FINDINGS    LEFT VENTRICLE:  The cavity size is moderately dilated. Wall thickness is  moderately increased. Systolic function is severely reduced. There is moderate  diffuse hypokinesis.    The ejection fraction was measured by biplane method  of disks. The ejection fraction is 30%. Cannot assess LV diastolic  function.    AORTIC VALVE:  The annulus is normal. The valve is trileaflet. The leaflets  are mildly thickened and mildly calcified. Cusp separation is normal. Velocity  is within the normal range. There is no stenosis. There is trivial  regurgitation. The mean systolic gradient is 4mm Hg. The peak systolic  gradient is 8mm Hg. The LVOT to aortic valve VTI ratio is 0.51. The valve area  is 1.8cm^2. The valve area index is 1.08cm^2/m^2. The ratio of LVOT to aortic  valve peak velocity is 0.5. The valve area is 1.7cm^2. The valve area index is  1.05cm^2/m^2.    AORTA:  Aortic root: The root is normal-sized.  Ascending aorta: The vessel is normal-sized.    MITRAL VALVE:  The annulus is mildly calcified. The leaflets are mildly  thickened and mildly calcified. Leaflet separation is normal. Inflow velocity  is within the normal range. There is no evidence for stenosis. There is  moderate regurgitation. The peak diastolic gradient is 5mm Hg. The valve area  by pressure half-time is 4.4cm^2. The valve area index by pressure half-time  is 2.66cm^2/m^2.    LEFT ATRIUM:  The atrium is moderately dilated.    RIGHT VENTRICLE:  The cavity size is normal. Wall thickness is normal.  Systolic function is normal. Pacemaker lead noted in right ventricle.  The RV pressure during systole is 25mm Hg.    VENTRICULAR SEPTUM:   Septal motion is dyssynergic.    PULMONIC VALVE:   The valve is structurally normal. Cusp separation is normal.  Velocity is within the normal range. There is trivial regurgitation.    TRICUSPID VALVE:  The valve is structurally normal. Leaflet separation is  normal. Inflow velocity is within the normal range. There is mild  regurgitation.    PULMONARY ARTERIES:  The main pulmonary artery is normal-sized. Systolic pressure is within the  normal range, estimated to be 25mm Hg.    RIGHT ATRIUM:  The atrium is normal in size. Pacemaker lead noted in right  atrium.       The estimated central venous pressure is 3mm  Hg.    PERICARDIUM:   There is no pericardial effusion.   No evidence of pleural  fluid accumulation.    SYSTEMIC VEINS:  Inferior vena cava: The IVC is normal-sized.    ------------------------------------------------------------------------------  Measurements    Left ventricle            Value             Ref        09/16/2021  Left atrium continued      Value          Ref       09/16/2021  FLORENCIO, LAX chord        (H) 5.8   cm          3.8 - 5.2  ----------  Area ES, A4C           (H) 26    cm^2     <=20      30  ESD, LAX chord        (H) 5.0   cm          2.2 - 3.5  ----------  Area/bsa ES, A4C           15.84 cm^2/m^2 --------- ----------  FLORENCIO/bsa, LAX chord    (H) 3.5   cm/m^2      2.3 - 3.1  ----------  Vol, ES, 1-p A4C       (H) 83    ml       22 - 52   101  ESD/bsa, LAX chord    (H) 3.0   cm/m^2      1.3 - 2.1  ----------  Vol/bsa, ES, 1-p A4C   (H) 50    ml/m^2   11 - 40   61  PW, ED, LAX           (H) 1.5   cm          0.6 - 0.9  ----------  FLORENCIO major ax, A4C         8.0   cm          ---------- ----------  Aortic valve               Value          Ref       09/16/2021  ESD major ax, A4C         7.7   cm          ---------- ----------  Peak v, S                  1.4   m/sec    --------- 1.8  FS major axis, A4C        4     %           ---------- ----------  Mean v, S                  0.96  m/sec    --------- 1.16  FLORENCIO/bsa major ax, A4C     4.8   cm/m^2      ---------- ----------  Mean grad, S               4     mm Hg    --------- 6  ESD/bsa major ax, A4C     4.6   cm/m^2      ---------- ----------  Peak grad, S               8     mm Hg    --------- 12  ROBERTO, A4C                  41.7  cm^2        ---------- ----------  LVOT/AV, VTI ratio         0.51           --------- ----------  ALICE, A4C                  33.5  cm^2        ---------- ----------  RENU, VTI                   1.8   cm^2     --------- ----------  FAC, A4C                  20    %           ---------- ----------  RENU/bsa, VTI                1.08  cm^2/m^2 --------- ----------  IVS, ED               (H) 1.6   cm          0.6 - 0.9  ----------  LVOT/AV, Vpeak ratio       0.5            --------- ----------  PW, ED                (H) 1.5   cm          0.6 - 0.9  ----------  RENU, Vmax                  1.7   cm^2     --------- ----------  IVS/PW, ED                1.09              ---------- ----------  RENU/bsa, Vmax              1.05  cm^2/m^2 --------- ----------  EDV                   (H) 196   ml          46 - 106   ----------  ESV                   (H) 122   ml          14 - 42    ----------  Mitral valve               Value          Ref       09/16/2021  EF                    (L) 30    %           54 - 74    ----------  Peak E                     1.09  m/sec    --------- 0.82  SV                        51    ml          ---------- ----------  Decel time                 173   ms       --------- 164  EDV/bsa               (H) 118   ml/m^2      29 - 61    ----------  PHT                        51    ms       --------- 48  ESV/bsa               (H) 74    ml/m^2      8 - 24     ----------  Peak grad, D               5     mm Hg    --------- 3  SV/bsa                    31    ml/m^2      ---------- ----------  MVA, PHT                   4.4   cm^2     --------- 4.6  SV, 1-p A4C               52    ml          ---------- ----------  MVA/bsa, PHT               2.66  cm^2/m^2 --------- 2.78  SV/bsa, 1-p A4C           31    ml/m^2      ---------- ----------  ESV, 2-p              (H) 121   ml          14 - 42    ----------  Tricuspid valve            Value          Ref       09/16/2021  ESV/bsa, 2-p          (H) 73    ml/m^2      8 - 24     ----------  TR peak v              (N) 2.4   m/sec    <=2.8     2.5  Peak RV-RA grad, S         22    mm Hg    --------- 26  LVOT                      Value             Ref        09/16/2021  Diam, S                   2.1   cm          ---------- ----------  Aortic root                Value          Ref        09/16/2021  Area                      3.5   cm^2        ---------- 4.5         Root diam, ED          (N) 3.1   cm       2.4 - 3.9 ----------  Peak ivana, S               0.72  m/sec       ---------- ----------  S-T junct diam, ED     (N) 2.8   cm       2.0 - 3.2 ----------  Peak grad, S              2     mm Hg       ---------- ----------  S-T junct diam/bsa, ED (N) 1.7   cm/m^2   1.1 - 1.9 ----------  Qs                        2.8   L/min       ---------- ----------  Qs/bsa                    1.7   L/(min-m^2) ---------- ----------  Ascending aorta            Value          Ref       09/16/2021  AAo AP diam, ED        (N) 3.3   cm       1.9 - 3.5 ----------  Right ventricle           Value             Ref        09/16/2021  AAo AP diam/bsa, ED    (N) 2.0   cm/m^2   1.0 - 2.2 ----------  TAPSE, MM             (N) 1.8   cm          >=1.7      ----------  Pressure, S               25    mm Hg       ---------- 34          Pulmonary artery           Value          Ref       09/16/2021  S' lateral            (H) 14.6  cm/sec      6.0 - 13.4 ----------  Pressure, S                25    mm Hg    --------- ----------    Left atrium               Value             Ref        09/16/2021  Systemic veins             Value          Ref       09/16/2021  AP dim, ES            (H) 5.7   cm          2.7 - 3.8  ----------  Estimated CVP              3     mm Hg    --------- 8  AP dim index, ES      (H) 3.4   cm/m^2      1.5 - 2.3  ----------  Legend:  (L)  and  (H)  krissy values outside specified reference range.    (N)  marks values inside specified reference range.    Prepared and electronically signed by  Cuate Abebe MD  06/07/2023 14:57           Labs   Admission on 06/07/2023   Component Date Value Ref Range Status   • Sodium 06/07/2023 141  135 - 145 mmol/L Final   • Potassium 06/07/2023 4.7  3.4 - 5.1 mmol/L Final   • Chloride 06/07/2023 110  97 - 110 mmol/L Final   • Carbon Dioxide 06/07/2023 28  21 - 32 mmol/L Final    • Anion Gap 06/07/2023 8  7 - 19 mmol/L Final   • Glucose 06/07/2023 114 (H)  70 - 99 mg/dL Final   • BUN 06/07/2023 22 (H)  6 - 20 mg/dL Final   • Creatinine 06/07/2023 1.14 (H)  0.51 - 0.95 mg/dL Final   • Glomerular Filtration Rate 06/07/2023 47 (L)  >=60 Final    eGFR 30-59 mL/min/1.73m2 = Moderate decrease in kidney function. Stage 3 CKD (chronic kidney disease) or moderate kidney disease. Estimated GFR calculated using the CKD-EPI-R (2021) equation that does not include race in the creatinine calculation.   • BUN/Cr 06/07/2023 19  7 - 25 Final   • Calcium 06/07/2023 10.4 (H)  8.4 - 10.2 mg/dL Final   • Bilirubin, Total 06/07/2023 0.8  0.2 - 1.0 mg/dL Final   • GOT/AST 06/07/2023 21  <=37 Units/L Final   • GPT/ALT 06/07/2023 25  <64 Units/L Final   • Alkaline Phosphatase 06/07/2023 82  45 - 117 Units/L Final   • Albumin 06/07/2023 3.5 (L)  3.6 - 5.1 g/dL Final   • Protein, Total 06/07/2023 7.1  6.4 - 8.2 g/dL Final   • Globulin 06/07/2023 3.6  2.0 - 4.0 g/dL Final   • A/G Ratio 06/07/2023 1.0  1.0 - 2.4 Final   • Troponin I, High Sensitivity 06/07/2023 29  <52 ng/L Final   • D Dimer, Quantitative 06/07/2023 0.80 (H)  <0.57 mg/L (FEU) Final   • WBC 06/07/2023 6.2  4.2 - 11.0 K/mcL Final   • RBC 06/07/2023 4.28  4.00 - 5.20 mil/mcL Final   • HGB 06/07/2023 13.7  12.0 - 15.5 g/dL Final   • HCT 06/07/2023 41.1  36.0 - 46.5 % Final   • MCV 06/07/2023 96.0  78.0 - 100.0 fl Final   • MCH 06/07/2023 32.0  26.0 - 34.0 pg Final   • MCHC 06/07/2023 33.3  32.0 - 36.5 g/dL Final   • RDW-CV 06/07/2023 12.9  11.0 - 15.0 % Final   • RDW-SD 06/07/2023 45.1  39.0 - 50.0 fL Final   • PLT 06/07/2023 207  140 - 450 K/mcL Final   • NRBC 06/07/2023 0  <=0 /100 WBC Final   • Neutrophil, Percent 06/07/2023 74  % Final   • Lymphocytes, Percent 06/07/2023 18  % Final   • Mono, Percent 06/07/2023 5  % Final   • Eosinophils, Percent 06/07/2023 1  % Final   • Basophils, Percent 06/07/2023 0  % Final   • Immature Granulocytes 06/07/2023 2   % Final   • Absolute Neutrophils 06/07/2023 4.5  1.8 - 7.7 K/mcL Final   • Absolute Lymphocytes 06/07/2023 1.1  1.0 - 4.0 K/mcL Final   • Absolute Monocytes 06/07/2023 0.3  0.3 - 0.9 K/mcL Final   • Absolute Eosinophils  06/07/2023 0.1  0.0 - 0.5 K/mcL Final   • Absolute Basophils 06/07/2023 0.0  0.0 - 0.3 K/mcL Final   • Absolute Immature Granulocytes 06/07/2023 0.1  0.0 - 0.2 K/mcL Final   Hospital Outpatient Visit on 06/07/2023   Component Date Value Ref Range Status   • Predicted HR Max 06/07/2023 133  BPM Final   • Ventricular Rate EKG/Min (BPM) 06/07/2023 62   Preliminary   • Atrial Rate (BPM) 06/07/2023 60   Preliminary   • QRS-Interval (MSEC) 06/07/2023 174   Preliminary   • QT-Interval (MSEC) 06/07/2023 486   Preliminary   • QTc 06/07/2023 494   Preliminary   • R Axis (Degrees) 06/07/2023 -27   Preliminary   • T Axis (Degrees) 06/07/2023 170   Preliminary   • REPORT TEXT 06/07/2023    Preliminary                    Value:Atrial-paced rhythm  with prolonged AV conduction  with occasional  premature ventricular complexes  Left bundle branch block  Abnormal ECG  When compared with ECG of  17-SEP-2021 07:16,  premature ventricular complexes  are now  present  Nonspecific T wave abnormality now evident in  Inferior leads  T wave inversion less evident in  Lateral leads     Hospital Outpatient Visit on 06/07/2023   Component Date Value Ref Range Status   • GLUCOSE, BEDSIDE - POINT OF CARE 06/07/2023 106 (H)  70 - 99 mg/dL Final    Notified RN         Assessment and Plan  87 year old female   Polish speaking got the history from the daughter Mary at the bedside with Hx.  CAD s/p PCI, ischemic cardiomyopathy with EF of 30 to 35%, HTN, HL, PVD, hypothyroidism, Pitts's esophagus came for  cardiodiagnostics for echo and stress test as outpatient had a chest pain during the cardio diagnostic.    #Chest pain during cardiac diagnostic testing:.  Echocardiogram completed showing EF of 30% and the previous EF was 30 to  35%.  CTA chest negative for PE and no acute findings.  Troponin negative.  EKG paced rhythm.  Ordered serial troponins.  Cardiologist Dr. Wylie group consulted.    #CAD s/p PCI to LAD in  the past: Continue with aspirin, Plavix and metoprolol.    #Chronic systolic heart failure with ischemic cardiomyopathy: Not fluid overloaded.  Resume beta-blocker, spironolactone.    #Sick sinus syndrome s/p pacemaker status.    #PVD: Follows with  as outpatient and recommended to continue aspirin and Plavix and statins.  No ulcers in the lower extremities.    #Pitts's esophagus: Continue with Protonix.  No epigastric tenderness.  Supposed to follow-up with GI  as outpatient    #Hypothyroidism: Continue Synthroid    #HL: Continue statins    DVT prophylaxis with heparin    Plan: Serial troponins, possible stress test per cardiologist for further evaluation.  Discussed with the daughter Mary and ER physician Dr. Beavers.      DVT Prophylaxis       Code Status    Code Status: Prior       primary care physician  PCP:  Beata Murray MD       Consultants:         Marvin Smith MD  6/7/2023    Right LE ROM was WNL (within normal limits)/Left Knee Flexion/extension AROM decreased by ~25%; otherwise WNL/bilateral upper extremity ROM was WNL (within normal limits)

## 2023-11-02 NOTE — DISCUSSION/SUMMARY
[Surgical risks reviewed] : Surgical risks reviewed [de-identified] : 69 year old  male with  severe medial compartment osteoarthritis of the bilateral knees. Based on imaging he is a candidate for staged bilateral TKA and I advised against pursuing arthroscopic surgery given the extent of cartilage loss. He is interested in pursuing left TKA. We discussed the surgery in detail, including the risks and recovery period. I answered all of his questions regarding the surgery, and he understands. He can schedule left TKA. \par \par A knee replacement means resurfacing of all 3 surfaces of the patella, the femur, and tibia with metal and plastic parts. The prosthetic parts are usually cemented into position and well outpatient range of motion from full extension to about 120° of flexion. The postoperative motion, however, is determined by multiple factors, the most important of which is preoperative motion. In general, the better motion preoperatively, the better the motion postoperatively. The operation, pending medical clearance, gently requires hospitalization of 1 to 2 days for one knee, 2 to 4 days for bilateral knee replacements. In general, we prefer to perform the procedure under spinal anesthesia with femoral nerve block and occasional single shot sciatic nerve block. We may ask a patient to give 2 units of blood for bilateral total knee arthroplasties, for one knee we institute a normogram which may include administration of preoperative Procrit. The operative procedure takes probably 1-2 hours. The operation requires a straight incision anywhere from 5-7 inches down from the knee. Post-operatively the patient will be walking the day of surgery. The first couple days are very painful and the pain medication will alleviate, but not eliminate the pain. The patient must really push hard to get range of motion. Our goal for having a person go home as that the range of motion is approximately 0-90° of flexion and that they can walk with a walker or cane. A walking aid is to be dispensed once the patient is secure enough. In general there, there is no cast or brace required with routine knee replacement. In the long term, we do not encourage our patients to run for the sake of running, although pending their preoperative status, we often allow patient to play doubles tennis or comparative activities. We also allowed them to do gentle intermediate downhill skiing if they are truly an expert skier. Biking is encouraged as well swimming. The followup periods are usually 3 weeks, 6 weeks, 3 months, and yearly intervals. Potential complications with total knee replacement included anesthetic complications and death, infection around 1%, nerve damage, by which means peroneal nerve palsy, footdrop or flapping foot with ambulation. This is particularly more apt to occur in the patient with a valgus or knock-knee deformity. The incidence can be quite high in this particular patient population. There will be areas of skin numbness, but this is not an untoward effect nor do we consider it a complication. Other potential complications include dislocation of the patella component, usually less than 2%; loosening of the tibial or femoral component is much more infrequent. Most often this occurs with infection or long-term use. Patient of extreme risk including markedly overweight patient's may be more prone to prosthetic wear. Major blood vessel damage is also extremely rare. Directly because of the anatomic proximity of the popliteal artery this could be lacerated with subsequent repair required. Be it unlikely, disruption of the popliteal artery could theoretically result in amputation. Similarly, infection could theoretically result in amputation if one were to grow out of an organism that cannot be controlled with antibiotics. General medical complications include phlebitis, for which we would prophylactically anticoagulate patients, but could still occur, and fatal pulmonary embolus which has been reported. Cardiovascular problems, such as heart attack or ischemia are always a concern with such hemodynamic changes in the blood vascular system. Other General complications are rare, but anything medicine could theoretically happen. I think the patient understands the risk benefit ratio of total knee replacement and will think about whether there like to pursue with an operation or nonoperative treatment program. I reviewed the plan of care as well as a model of a total knee implant equivalent to the one that will be used for their total knee joint replacement. The patient agreed to the plan of care as well as the use of implants for their knee total joint replacement.  Qbrexza Pregnancy And Lactation Text: There is no available data on Qbrexza use in pregnant women.  There is no available data on Qbrexza use in lactation.

## 2023-12-13 ENCOUNTER — OFFICE (OUTPATIENT)
Dept: URBAN - METROPOLITAN AREA CLINIC 115 | Facility: CLINIC | Age: 73
Setting detail: OPHTHALMOLOGY
End: 2023-12-13
Payer: MEDICARE

## 2023-12-13 DIAGNOSIS — H35.373: ICD-10-CM

## 2023-12-13 DIAGNOSIS — H52.4: ICD-10-CM

## 2023-12-13 DIAGNOSIS — H01.015: ICD-10-CM

## 2023-12-13 DIAGNOSIS — H01.012: ICD-10-CM

## 2023-12-13 DIAGNOSIS — H35.363: ICD-10-CM

## 2023-12-13 DIAGNOSIS — H35.033: ICD-10-CM

## 2023-12-13 DIAGNOSIS — H35.343: ICD-10-CM

## 2023-12-13 PROCEDURE — 92250 FUNDUS PHOTOGRAPHY W/I&R: CPT | Performed by: OPTOMETRIST

## 2023-12-13 PROCEDURE — 92015 DETERMINE REFRACTIVE STATE: CPT | Performed by: OPTOMETRIST

## 2023-12-13 PROCEDURE — 92014 COMPRE OPH EXAM EST PT 1/>: CPT | Performed by: OPTOMETRIST

## 2023-12-13 ASSESSMENT — REFRACTION_AUTOREFRACTION
OD_AXIS: 105
OD_SPHERE: +0.50
OS_SPHERE: 0.00
OD_CYLINDER: -0.50
OS_CYLINDER: -1.25
OS_AXIS: 082

## 2023-12-13 ASSESSMENT — REFRACTION_CURRENTRX
OS_SPHERE: PLANO
OS_ADD: +2.25
OS_CYLINDER: -1.00
OS_ADD: +3.00
OS_VPRISM_DIRECTION: PROGS
OS_AXIS: 058
OD_SPHERE: +1.25
OD_CYLINDER: -1.25
OD_CYLINDER: -0.75
OD_ADD: +2.25
OS_CYLINDER: -0.75
OS_OVR_VA: 20/
OD_OVR_VA: 20/
OD_SPHERE: +0.50
OD_VPRISM_DIRECTION: PROGS
OS_OVR_VA: 20/
OD_VPRISM_DIRECTION: PROGS
OD_OVR_VA: 20/
OD_ADD: +3.00
OD_AXIS: 095
OS_VPRISM_DIRECTION: PROGS
OS_SPHERE: PLANO
OD_AXIS: 115
OS_AXIS: 068

## 2023-12-13 ASSESSMENT — LID EXAM ASSESSMENTS
OS_BLEPHARITIS: LLL 1+
OD_MEIBOMITIS: RLL
OS_MEIBOMITIS: LUL
OD_COMMENTS: DEMODEX
OD_BLEPHARITIS: RLL 1+
OS_COMMENTS: DEMODEX

## 2023-12-13 ASSESSMENT — REFRACTION_MANIFEST
OS_SPHERE: PLANO
OD_AXIS: 105
OD_VA2: 20/20
OS_VA1: 20/30+1
OD_VA1: 20/30-
OS_CYLINDER: -1.00
OS_ADD: +2.00
OD_VA1: 20/70
OD_SPHERE: PLANO
OD_CYLINDER: -0.50
OS_ADD: +3.00
OD_AXIS: 092
OD_CYLINDER: -0.50
OU_VA: 20/25
OD_ADD: +2.00
OD_SPHERE: +0.50
OS_VA1: 20/40
OS_CYLINDER: -0.75
OS_AXIS: 082
OS_VA2: 20/20
OS_SPHERE: +0.25
OS_AXIS: 082
OD_ADD: +3.00

## 2023-12-13 ASSESSMENT — SPHEQUIV_DERIVED
OS_SPHEQUIV: -0.125
OD_SPHEQUIV: 0.25
OD_SPHEQUIV: 0.25
OS_SPHEQUIV: -0.625

## 2023-12-13 ASSESSMENT — CONFRONTATIONAL VISUAL FIELD TEST (CVF)
OS_FINDINGS: FULL
OD_FINDINGS: FULL

## 2024-01-06 ENCOUNTER — OFFICE (OUTPATIENT)
Dept: URBAN - METROPOLITAN AREA CLINIC 12 | Facility: CLINIC | Age: 74
Setting detail: OPHTHALMOLOGY
End: 2024-01-06
Payer: MEDICARE

## 2024-01-06 DIAGNOSIS — H52.4: ICD-10-CM

## 2024-01-06 PROBLEM — H16.223 DRY EYE SYNDROME K SICCA; BOTH EYES: Status: ACTIVE | Noted: 2024-01-06

## 2024-01-06 PROCEDURE — RX/CHECK RX/CHECK: Performed by: OPTOMETRIST

## 2024-01-06 ASSESSMENT — REFRACTION_MANIFEST
OD_SPHERE: +0.50
OS_SPHERE: PLANO
OD_VA1: 20/70
OS_CYLINDER: -0.75
OS_SPHERE: +0.25
OD_AXIS: 105
OD_AXIS: 105
OS_AXIS: 090
OS_VA2: 20/20
OS_AXIS: 090
OS_AXIS: 082
OS_CYLINDER: -1.00
OS_SPHERE: +0.25
OD_VA1: 20/30-
OD_CYLINDER: -0.50
OD_VPRISM: BD
OS_CYLINDER: -1.25
OS_ADD: +3.00
OD_ADD: +3.00
OS_HPRISM: 2
OS_ADD: +2.00
OU_VA: 20/25
OS_VA1: 20/30+1
OD_VA1: 20/60
OS_SPHERE: +0.25
OD_CYLINDER: -0.50
OD_CYLINDER: -0.75
OD_VA1: 20/30-
OS_VPRISM_DIRECTION: BO
OD_AXIS: 105
OS_VA1: 20/40
OS_VA1: 20/50+2
OS_VA1: 20/30+1
OD_ADD: +2.00
OD_CYLINDER: -1.00
OS_ADD: +3.00
OS_CYLINDER: -1.00
OS_AXIS: 082
OD_AXIS: 092
OD_ADD: +3.00
OD_SPHERE: +0.50
OD_SPHERE: +1.00
OD_VA2: 20/20
OD_SPHERE: PLANO

## 2024-01-06 ASSESSMENT — REFRACTION_AUTOREFRACTION
OD_CYLINDER: -1.00
OS_SPHERE: +0.25
OS_AXIS: 088
OS_CYLINDER: -1.25
OD_AXIS: 107
OD_SPHERE: +1.00

## 2024-01-06 ASSESSMENT — SPHEQUIV_DERIVED
OD_SPHEQUIV: 0.125
OD_SPHEQUIV: 0.25
OD_SPHEQUIV: 0.5
OS_SPHEQUIV: -0.125
OS_SPHEQUIV: -0.25
OS_SPHEQUIV: -0.375
OD_SPHEQUIV: 0.5
OS_SPHEQUIV: -0.375

## 2024-01-06 ASSESSMENT — REFRACTION_CURRENTRX
OS_CYLINDER: -1.00
OD_VPRISM_DIRECTION: PROGS
OS_VPRISM_DIRECTION: PROGS
OD_CYLINDER: -0.75
OD_OVR_VA: 20/
OD_SPHERE: +0.75
OS_AXIS: 066
OD_ADD: +3.00
OD_SPHERE: +0.75
OS_ADD: +3.00
OD_AXIS: 089
OS_SPHERE: +0.25
OD_CYLINDER: -0.75
OS_OVR_VA: 20/
OS_CYLINDER: -1.25
OD_OVR_VA: 20/
OS_AXIS: 086
OS_VPRISM_DIRECTION: PROGS
OS_SPHERE: +0.25
OS_ADD: +3.00
OS_OVR_VA: 20/
OD_ADD: +3.00
OD_VPRISM_DIRECTION: PROGS
OD_AXIS: 101

## 2024-01-06 ASSESSMENT — LID EXAM ASSESSMENTS
OD_BLEPHARITIS: RLL 1+
OD_COMMENTS: DEMODEX
OS_BLEPHARITIS: LLL 1+
OD_MEIBOMITIS: RLL
OS_MEIBOMITIS: LUL
OS_COMMENTS: DEMODEX

## 2024-01-06 ASSESSMENT — CONFRONTATIONAL VISUAL FIELD TEST (CVF)
OD_FINDINGS: FULL
OS_FINDINGS: FULL

## 2024-01-06 ASSESSMENT — SUPERFICIAL PUNCTATE KERATITIS (SPK)
OS_SPK: 1+
OD_SPK: 1+

## 2024-05-22 NOTE — H&P PST ADULT - NSANTHSNORERD_ENT_A_CORE
How Did The Hair Loss Occur?: gradual in onset How Severe Is Your Hair Loss?: moderate Additional History: Patient presents for hair loss on scalp present for 3 years Yes

## 2024-09-06 NOTE — PATIENT PROFILE ADULT - DO YOU FEEL UNSAFE AT HOME, WORK, OR SCHOOL?
Vital Signs: I have reviewed the initial vital signs.  Constitutional: well-nourished, appears stated age, no acute distress  Head: atraumatic and normocephalic  Eyes:PERRLA, EOMI, clear conjunctiva  ENT: , MMM  Cardiovascular: regular rate, regular rhythm,  Respiratory: unlabored respiratory effort, clear to auscultation bilaterally  Gastrointestinal: soft, non-tender abdomen, no pulsatile mass, no CVA tenderness  Neuro: awake, alert, follows commands, oriented, no focal deficits,  ;
English
no

## 2024-12-13 ENCOUNTER — OFFICE (OUTPATIENT)
Dept: URBAN - METROPOLITAN AREA CLINIC 115 | Facility: CLINIC | Age: 74
Setting detail: OPHTHALMOLOGY
End: 2024-12-13
Payer: MEDICARE

## 2024-12-13 DIAGNOSIS — Z96.1: ICD-10-CM

## 2024-12-13 DIAGNOSIS — H35.363: ICD-10-CM

## 2024-12-13 DIAGNOSIS — H35.373: ICD-10-CM

## 2024-12-13 DIAGNOSIS — H35.033: ICD-10-CM

## 2024-12-13 DIAGNOSIS — Z94.7: ICD-10-CM

## 2024-12-13 DIAGNOSIS — H16.223: ICD-10-CM

## 2024-12-13 PROCEDURE — 92134 CPTRZ OPH DX IMG PST SGM RTA: CPT | Performed by: OPTOMETRIST

## 2024-12-13 PROCEDURE — 92014 COMPRE OPH EXAM EST PT 1/>: CPT | Performed by: OPTOMETRIST

## 2024-12-13 ASSESSMENT — LID EXAM ASSESSMENTS
OD_BLEPHARITIS: RLL 1+
OS_BLEPHARITIS: LLL 1+
OD_COMMENTS: DEMODEX
OS_MEIBOMITIS: LUL
OS_COMMENTS: DEMODEX
OD_MEIBOMITIS: RLL

## 2024-12-13 ASSESSMENT — VISUAL ACUITY
OS_BCVA: 20/50-1
OD_BCVA: 20/50

## 2024-12-13 ASSESSMENT — REFRACTION_CURRENTRX
OS_AXIS: 066
OD_CYLINDER: -0.75
OD_VPRISM_DIRECTION: PROGS
OS_OVR_VA: 20/
OS_OVR_VA: 20/
OD_SPHERE: +0.75
OD_ADD: +2.25
OS_OVR_VA: 20/
OS_ADD: +2.25
OS_AXIS: 086
OS_VPRISM_DIRECTION: PROGS
OD_ADD: +3.00
OD_ADD: +3.00
OS_SPHERE: +0.25
OD_SPHERE: +0.75
OS_CYLINDER: -0.75
OD_OVR_VA: 20/
OD_AXIS: 101
OS_CYLINDER: -1.00
OS_VPRISM_DIRECTION: PROGS
OS_ADD: +3.00
OS_CYLINDER: -1.25
OD_AXIS: 091
OD_OVR_VA: 20/
OD_VPRISM_DIRECTION: PROGS
OS_ADD: +3.00
OD_VPRISM_DIRECTION: PROGS
OD_CYLINDER: -0.75
OD_OVR_VA: 20/
OS_SPHERE: +0.50
OD_SPHERE: +0.75
OS_AXIS: 076
OS_VPRISM_DIRECTION: PROGS
OD_AXIS: 089
OD_CYLINDER: -0.50
OS_SPHERE: +0.25

## 2024-12-13 ASSESSMENT — REFRACTION_MANIFEST
OD_ADD: +2.00
OS_VPRISM_DIRECTION: BO
OD_ADD: +3.00
OS_CYLINDER: -0.75
OD_SPHERE: +0.50
OS_SPHERE: +0.25
OS_AXIS: 090
OD_AXIS: 092
OD_VA1: 20/30-
OD_CYLINDER: -0.50
OS_AXIS: 082
OD_VA1: 20/30-
OD_CYLINDER: -0.75
OS_VA1: 20/30+1
OS_AXIS: 090
OS_VA1: 20/50+2
OS_VA2: 20/20
OS_SPHERE: +0.25
OS_VA1: 20/30+1
OD_CYLINDER: -1.00
OD_VA1: 20/60
OD_VPRISM: BD
OS_VA1: 20/40
OS_CYLINDER: -1.25
OS_ADD: +2.00
OU_VA: 20/25
OS_SPHERE: PLANO
OS_ADD: +3.00
OD_AXIS: 105
OD_VA1: 20/70
OS_CYLINDER: -1.00
OS_HPRISM: 2
OD_CYLINDER: -0.50
OS_CYLINDER: -1.00
OD_SPHERE: +1.00
OD_AXIS: 105
OD_AXIS: 105
OD_SPHERE: PLANO
OS_ADD: +3.00
OS_SPHERE: +0.25
OS_AXIS: 082
OD_SPHERE: +0.50
OD_ADD: +3.00
OD_VA2: 20/20

## 2024-12-13 ASSESSMENT — KERATOMETRY
OD_AXISANGLE_DEGREES: 014
OS_K2POWER_DIOPTERS: 42.75
OS_K1POWER_DIOPTERS: 41.50
OD_K1POWER_DIOPTERS: 41.75
OS_AXISANGLE_DEGREES: 166
OD_K2POWER_DIOPTERS: 42.00
METHOD_AUTO_MANUAL: AUTO

## 2024-12-13 ASSESSMENT — SUPERFICIAL PUNCTATE KERATITIS (SPK)
OD_SPK: 1+
OS_SPK: 1+

## 2024-12-13 ASSESSMENT — TONOMETRY
OS_IOP_MMHG: 15
OD_IOP_MMHG: 17

## 2024-12-13 ASSESSMENT — REFRACTION_AUTOREFRACTION
OS_AXIS: 080
OS_CYLINDER: -1.25
OD_SPHERE: +0.25
OD_CYLINDER: -0.50
OS_SPHERE: 0.00
OD_AXIS: 104

## 2024-12-13 ASSESSMENT — CONFRONTATIONAL VISUAL FIELD TEST (CVF)
OD_FINDINGS: FULL
OS_FINDINGS: FULL

## 2025-06-04 ENCOUNTER — OFFICE (OUTPATIENT)
Dept: URBAN - METROPOLITAN AREA CLINIC 115 | Facility: CLINIC | Age: 75
Setting detail: OPHTHALMOLOGY
End: 2025-06-04
Payer: MEDICARE

## 2025-06-04 DIAGNOSIS — H35.363: ICD-10-CM

## 2025-06-04 DIAGNOSIS — H16.223: ICD-10-CM

## 2025-06-04 DIAGNOSIS — H35.373: ICD-10-CM

## 2025-06-04 DIAGNOSIS — Z96.1: ICD-10-CM

## 2025-06-04 DIAGNOSIS — H35.033: ICD-10-CM

## 2025-06-04 DIAGNOSIS — Z94.7: ICD-10-CM

## 2025-06-04 PROCEDURE — 92250 FUNDUS PHOTOGRAPHY W/I&R: CPT | Performed by: OPHTHALMOLOGY

## 2025-06-04 PROCEDURE — 92014 COMPRE OPH EXAM EST PT 1/>: CPT | Performed by: OPHTHALMOLOGY

## 2025-06-04 ASSESSMENT — REFRACTION_MANIFEST
OD_SPHERE: PLANO
OD_SPHERE: +1.00
OS_AXIS: 090
OS_SPHERE: +0.25
OD_VA1: 20/60
OD_CYLINDER: -0.50
OD_AXIS: 105
OD_CYLINDER: -0.75
OS_CYLINDER: -1.25
OU_VA: 20/25
OD_VA1: 20/30-
OD_VA1: 20/30-
OD_CYLINDER: -0.50
OD_AXIS: 092
OS_ADD: +3.00
OS_CYLINDER: -1.00
OS_ADD: +2.00
OS_AXIS: 082
OS_VA1: 20/30+1
OD_SPHERE: +0.50
OS_AXIS: 082
OD_VA1: 20/70
OD_VA2: 20/20
OS_VA1: 20/40
OS_ADD: +3.00
OS_SPHERE: +0.25
OD_VPRISM: BD
OS_VA1: 20/30+1
OS_VA2: 20/20
OD_ADD: +3.00
OD_SPHERE: +0.50
OS_VPRISM_DIRECTION: BO
OD_AXIS: 105
OD_AXIS: 105
OS_VA1: 20/50+2
OS_SPHERE: PLANO
OD_ADD: +3.00
OS_CYLINDER: -1.00
OD_CYLINDER: -1.00
OS_SPHERE: +0.25
OS_CYLINDER: -0.75
OS_HPRISM: 2
OD_ADD: +2.00
OS_AXIS: 090

## 2025-06-04 ASSESSMENT — REFRACTION_CURRENTRX
OD_CYLINDER: -0.50
OD_ADD: +3.00
OS_ADD: +3.00
OD_AXIS: 101
OS_VPRISM_DIRECTION: PROGS
OS_CYLINDER: -0.75
OD_SPHERE: +0.75
OS_AXIS: 066
OS_AXIS: 086
OD_ADD: +2.25
OS_OVR_VA: 20/
OS_CYLINDER: -1.25
OS_SPHERE: +0.50
OD_CYLINDER: -0.75
OS_VPRISM_DIRECTION: PROGS
OD_CYLINDER: -0.75
OD_OVR_VA: 20/
OS_AXIS: 076
OD_VPRISM_DIRECTION: PROGS
OD_ADD: +3.00
OS_OVR_VA: 20/
OS_VPRISM_DIRECTION: PROGS
OD_VPRISM_DIRECTION: PROGS
OD_AXIS: 091
OD_OVR_VA: 20/
OD_OVR_VA: 20/
OS_CYLINDER: -1.00
OS_SPHERE: +0.25
OS_SPHERE: +0.25
OD_VPRISM_DIRECTION: PROGS
OS_ADD: +3.00
OS_OVR_VA: 20/
OD_SPHERE: +0.75
OD_AXIS: 089
OS_ADD: +2.25
OD_SPHERE: +0.75

## 2025-06-04 ASSESSMENT — KERATOMETRY
METHOD_AUTO_MANUAL: AUTO
OD_K1POWER_DIOPTERS: 41.75
OS_AXISANGLE_DEGREES: 166
OD_AXISANGLE_DEGREES: 014
OS_K1POWER_DIOPTERS: 41.50
OS_K2POWER_DIOPTERS: 42.75
OD_K2POWER_DIOPTERS: 42.00

## 2025-06-04 ASSESSMENT — REFRACTION_AUTOREFRACTION
OS_CYLINDER: -1.25
OD_SPHERE: +0.25
OD_CYLINDER: -0.50
OD_AXIS: 104
OS_SPHERE: 0.00
OS_AXIS: 080

## 2025-06-04 ASSESSMENT — LID EXAM ASSESSMENTS
OD_MEIBOMITIS: RLL
OS_BLEPHARITIS: LLL 1+
OD_COMMENTS: DEMODEX
OD_BLEPHARITIS: RLL 1+
OS_COMMENTS: DEMODEX
OS_MEIBOMITIS: LUL

## 2025-06-04 ASSESSMENT — TONOMETRY
OD_IOP_MMHG: 15
OD_IOP_MMHG: 12
OS_IOP_MMHG: 14

## 2025-06-04 ASSESSMENT — VISUAL ACUITY
OS_BCVA: 20/50-1
OD_BCVA: 20/50

## 2025-06-04 ASSESSMENT — SUPERFICIAL PUNCTATE KERATITIS (SPK)
OD_SPK: 1+
OS_SPK: 1+

## 2025-06-04 ASSESSMENT — CONFRONTATIONAL VISUAL FIELD TEST (CVF)
OD_FINDINGS: FULL
OS_FINDINGS: FULL